# Patient Record
Sex: MALE | Race: WHITE | Employment: OTHER | URBAN - METROPOLITAN AREA
[De-identification: names, ages, dates, MRNs, and addresses within clinical notes are randomized per-mention and may not be internally consistent; named-entity substitution may affect disease eponyms.]

---

## 2018-07-16 LAB
CHOLESTEROL, TOTAL: 167 MG/DL
CHOLESTEROL/HDL RATIO: NORMAL
HDLC SERPL-MCNC: 43 MG/DL (ref 35–70)
LDL CHOLESTEROL CALCULATED: 110 MG/DL (ref 0–160)
TRIGL SERPL-MCNC: 137 MG/DL
VLDLC SERPL CALC-MCNC: NORMAL MG/DL

## 2018-10-16 ENCOUNTER — NURSE ONLY (OUTPATIENT)
Dept: FAMILY MEDICINE CLINIC | Age: 68
End: 2018-10-16
Payer: COMMERCIAL

## 2018-10-16 DIAGNOSIS — Z23 NEED FOR INFLUENZA VACCINATION: Primary | ICD-10-CM

## 2018-10-16 PROCEDURE — G0008 ADMIN INFLUENZA VIRUS VAC: HCPCS | Performed by: FAMILY MEDICINE

## 2018-10-16 PROCEDURE — 90662 IIV NO PRSV INCREASED AG IM: CPT | Performed by: FAMILY MEDICINE

## 2019-01-16 LAB
CHOLESTEROL, TOTAL: 220 MG/DL
CHOLESTEROL/HDL RATIO: NORMAL
HDLC SERPL-MCNC: 45 MG/DL (ref 35–70)
LDL CHOLESTEROL CALCULATED: 160 MG/DL (ref 0–160)
TRIGL SERPL-MCNC: 169 MG/DL
VLDLC SERPL CALC-MCNC: NORMAL MG/DL

## 2019-01-28 ENCOUNTER — OFFICE VISIT (OUTPATIENT)
Dept: FAMILY MEDICINE CLINIC | Age: 69
End: 2019-01-28
Payer: MEDICARE

## 2019-01-28 VITALS
RESPIRATION RATE: 18 BRPM | DIASTOLIC BLOOD PRESSURE: 82 MMHG | HEIGHT: 68 IN | TEMPERATURE: 98.2 F | WEIGHT: 200 LBS | OXYGEN SATURATION: 97 % | SYSTOLIC BLOOD PRESSURE: 128 MMHG | HEART RATE: 80 BPM | BODY MASS INDEX: 30.31 KG/M2

## 2019-01-28 DIAGNOSIS — Z00.00 ROUTINE GENERAL MEDICAL EXAMINATION AT A HEALTH CARE FACILITY: Primary | ICD-10-CM

## 2019-01-28 PROCEDURE — G0439 PPPS, SUBSEQ VISIT: HCPCS | Performed by: FAMILY MEDICINE

## 2019-01-28 RX ORDER — ALLOPURINOL 100 MG/1
200 TABLET ORAL DAILY
COMMUNITY
End: 2020-02-05

## 2019-01-28 RX ORDER — ROSUVASTATIN CALCIUM 10 MG/1
5 TABLET, COATED ORAL EVERY OTHER DAY
COMMUNITY
End: 2020-02-05

## 2019-01-28 ASSESSMENT — ANXIETY QUESTIONNAIRES: GAD7 TOTAL SCORE: 0

## 2019-01-28 ASSESSMENT — PATIENT HEALTH QUESTIONNAIRE - PHQ9
SUM OF ALL RESPONSES TO PHQ QUESTIONS 1-9: 0
SUM OF ALL RESPONSES TO PHQ QUESTIONS 1-9: 0

## 2019-01-28 ASSESSMENT — LIFESTYLE VARIABLES
AUDIT TOTAL SCORE: 4
HOW OFTEN DURING THE LAST YEAR HAVE YOU BEEN UNABLE TO REMEMBER WHAT HAPPENED THE NIGHT BEFORE BECAUSE YOU HAD BEEN DRINKING: 0
HOW OFTEN DURING THE LAST YEAR HAVE YOU FAILED TO DO WHAT WAS NORMALLY EXPECTED FROM YOU BECAUSE OF DRINKING: 0
HOW OFTEN DURING THE LAST YEAR HAVE YOU NEEDED AN ALCOHOLIC DRINK FIRST THING IN THE MORNING TO GET YOURSELF GOING AFTER A NIGHT OF HEAVY DRINKING: 0
HOW OFTEN DO YOU HAVE A DRINK CONTAINING ALCOHOL: 4
HOW OFTEN DURING THE LAST YEAR HAVE YOU HAD A FEELING OF GUILT OR REMORSE AFTER DRINKING: 0
AUDIT-C TOTAL SCORE: 4
HOW MANY STANDARD DRINKS CONTAINING ALCOHOL DO YOU HAVE ON A TYPICAL DAY: 0
HOW OFTEN DURING THE LAST YEAR HAVE YOU FOUND THAT YOU WERE NOT ABLE TO STOP DRINKING ONCE YOU HAD STARTED: 0
HAS A RELATIVE, FRIEND, DOCTOR, OR ANOTHER HEALTH PROFESSIONAL EXPRESSED CONCERN ABOUT YOUR DRINKING OR SUGGESTED YOU CUT DOWN: 0
HAVE YOU OR SOMEONE ELSE BEEN INJURED AS A RESULT OF YOUR DRINKING: 0
HOW OFTEN DO YOU HAVE SIX OR MORE DRINKS ON ONE OCCASION: 0

## 2019-07-23 LAB
ALBUMIN SERPL-MCNC: 4.1 G/DL
ALP BLD-CCNC: 44 U/L
ALT SERPL-CCNC: 24 U/L
ANION GAP SERPL CALCULATED.3IONS-SCNC: 10 MMOL/L
AST SERPL-CCNC: 15 U/L
BASOPHILS ABSOLUTE: 0 /ΜL
BASOPHILS RELATIVE PERCENT: 0.4 %
BILIRUB SERPL-MCNC: 0.8 MG/DL (ref 0.1–1.4)
BILIRUBIN, URINE: NEGATIVE
BLOOD, URINE: NEGATIVE
BUN BLDV-MCNC: 23 MG/DL
CALCIUM SERPL-MCNC: 9.9 MG/DL
CHLORIDE BLD-SCNC: 109 MMOL/L
CHOLESTEROL, TOTAL: 208 MG/DL
CHOLESTEROL/HDL RATIO: NORMAL
CLARITY: CLEAR
CO2: 28 MMOL/L
COLOR: YELLOW
CREAT SERPL-MCNC: 1.2 MG/DL
EOSINOPHILS ABSOLUTE: 0.1 /ΜL
EOSINOPHILS RELATIVE PERCENT: 2 %
GFR CALCULATED: 60
GLUCOSE BLD-MCNC: 105 MG/DL
GLUCOSE URINE: NEGATIVE
HCT VFR BLD CALC: 45.4 % (ref 41–53)
HDLC SERPL-MCNC: 46 MG/DL (ref 35–70)
HEMOGLOBIN: 15.3 G/DL (ref 13.5–17.5)
KETONES, URINE: NEGATIVE
LDL CHOLESTEROL CALCULATED: 135 MG/DL (ref 0–160)
LEUKOCYTE ESTERASE, URINE: NORMAL
LYMPHOCYTES ABSOLUTE: 1.4 /ΜL
LYMPHOCYTES RELATIVE PERCENT: 28.7 %
MCH RBC QN AUTO: 30.1 PG
MCHC RBC AUTO-ENTMCNC: 33.6 G/DL
MCV RBC AUTO: 89.5 FL
MONOCYTES ABSOLUTE: 0.4 /ΜL
MONOCYTES RELATIVE PERCENT: 8.6 %
NEUTROPHILS ABSOLUTE: 2.9 /ΜL
NEUTROPHILS RELATIVE PERCENT: 60.3 %
NITRITE, URINE: NEGATIVE
PH UA: 8 (ref 4.5–8)
PLATELET # BLD: 124 K/ΜL
PMV BLD AUTO: 10.3 FL
POTASSIUM SERPL-SCNC: 4.4 MMOL/L
PROSTATE SPECIFIC ANTIGEN: 0.5 NG/ML
PROTEIN UA: NEGATIVE
RBC # BLD: 5.08 10^6/ΜL
SODIUM BLD-SCNC: 143 MMOL/L
SPECIFIC GRAVITY, URINE: 1
TOTAL PROTEIN: 6.9
TRIGL SERPL-MCNC: 102 MG/DL
TSH SERPL DL<=0.05 MIU/L-ACNC: 2.06 UIU/ML
UROBILINOGEN, URINE: NORMAL
VITAMIN D 25-HYDROXY: 64
VITAMIN D2, 25 HYDROXY: NORMAL
VITAMIN D3,25 HYDROXY: NORMAL
VLDLC SERPL CALC-MCNC: NORMAL MG/DL
WBC # BLD: 4.7 10^3/ML

## 2019-10-23 ENCOUNTER — NURSE ONLY (OUTPATIENT)
Dept: PRIMARY CARE CLINIC | Age: 69
End: 2019-10-23
Payer: MEDICARE

## 2019-10-23 DIAGNOSIS — Z23 NEED FOR INFLUENZA VACCINATION: Primary | ICD-10-CM

## 2019-10-23 PROCEDURE — 90653 IIV ADJUVANT VACCINE IM: CPT | Performed by: FAMILY MEDICINE

## 2019-10-23 PROCEDURE — G0008 ADMIN INFLUENZA VIRUS VAC: HCPCS | Performed by: FAMILY MEDICINE

## 2020-02-05 ENCOUNTER — OFFICE VISIT (OUTPATIENT)
Dept: PRIMARY CARE CLINIC | Age: 70
End: 2020-02-05
Payer: MEDICARE

## 2020-02-05 VITALS
HEIGHT: 68 IN | WEIGHT: 196 LBS | DIASTOLIC BLOOD PRESSURE: 78 MMHG | RESPIRATION RATE: 16 BRPM | OXYGEN SATURATION: 97 % | BODY MASS INDEX: 29.7 KG/M2 | SYSTOLIC BLOOD PRESSURE: 122 MMHG | HEART RATE: 87 BPM

## 2020-02-05 PROCEDURE — G0439 PPPS, SUBSEQ VISIT: HCPCS | Performed by: FAMILY MEDICINE

## 2020-02-05 ASSESSMENT — LIFESTYLE VARIABLES
HAS A RELATIVE, FRIEND, DOCTOR, OR ANOTHER HEALTH PROFESSIONAL EXPRESSED CONCERN ABOUT YOUR DRINKING OR SUGGESTED YOU CUT DOWN: 0
AUDIT-C TOTAL SCORE: 4
HOW MANY STANDARD DRINKS CONTAINING ALCOHOL DO YOU HAVE ON A TYPICAL DAY: 0
HOW OFTEN DURING THE LAST YEAR HAVE YOU NEEDED AN ALCOHOLIC DRINK FIRST THING IN THE MORNING TO GET YOURSELF GOING AFTER A NIGHT OF HEAVY DRINKING: 0
HOW OFTEN DURING THE LAST YEAR HAVE YOU FOUND THAT YOU WERE NOT ABLE TO STOP DRINKING ONCE YOU HAD STARTED: 0
HOW OFTEN DURING THE LAST YEAR HAVE YOU BEEN UNABLE TO REMEMBER WHAT HAPPENED THE NIGHT BEFORE BECAUSE YOU HAD BEEN DRINKING: 0
HOW OFTEN DO YOU HAVE A DRINK CONTAINING ALCOHOL: 4
HOW OFTEN DURING THE LAST YEAR HAVE YOU HAD A FEELING OF GUILT OR REMORSE AFTER DRINKING: 0
HOW OFTEN DURING THE LAST YEAR HAVE YOU FAILED TO DO WHAT WAS NORMALLY EXPECTED FROM YOU BECAUSE OF DRINKING: 0
HAVE YOU OR SOMEONE ELSE BEEN INJURED AS A RESULT OF YOUR DRINKING: 0
AUDIT TOTAL SCORE: 4
HOW OFTEN DO YOU HAVE SIX OR MORE DRINKS ON ONE OCCASION: 0

## 2020-02-05 ASSESSMENT — PATIENT HEALTH QUESTIONNAIRE - PHQ9
SUM OF ALL RESPONSES TO PHQ QUESTIONS 1-9: 0
SUM OF ALL RESPONSES TO PHQ QUESTIONS 1-9: 0

## 2020-02-05 NOTE — PATIENT INSTRUCTIONS
Personalized Preventive Plan for Yancey Klinefelter - 2/5/2020  Medicare offers a range of preventive health benefits. Some of the tests and screenings are paid in full while other may be subject to a deductible, co-insurance, and/or copay. Some of these benefits include a comprehensive review of your medical history including lifestyle, illnesses that may run in your family, and various assessments and screenings as appropriate. After reviewing your medical record and screening and assessments performed today your provider may have ordered immunizations, labs, imaging, and/or referrals for you. A list of these orders (if applicable) as well as your Preventive Care list are included within your After Visit Summary for your review. Other Preventive Recommendations:    · A preventive eye exam performed by an eye specialist is recommended every 1-2 years to screen for glaucoma; cataracts, macular degeneration, and other eye disorders. · A preventive dental visit is recommended every 6 months. · Try to get at least 150 minutes of exercise per week or 10,000 steps per day on a pedometer . · Order or download the FREE \"Exercise & Physical Activity: Your Everyday Guide\" from [employer]. Call 1-154.893.6710 or search [employer] online. · You need 1774-9624 mg of calcium and 6452-9696 IU of vitamin D per day. It is possible to meet your calcium requirement with diet alone, but a vitamin D supplement is usually necessary to meet this goal.  · When exposed to the sun, use a sunscreen that protects against both UVA and UVB radiation with an SPF of 30 or greater. Reapply every 2 to 3 hours or after sweating, drying off with a towel, or swimming. · Always wear a seat belt when traveling in a car. Always wear a helmet when riding a bicycle or motorcycle.

## 2020-02-05 NOTE — PROGRESS NOTES
Medicare Annual Wellness Visit  Name: Kimberli Hammond Date: 2020   MRN: <[id number]> Sex: Male   Age: 78 y.o. Ethnicity: Non-/Non    : 1950 Race: Emre Cheney is here for Medicare AWV    Screenings for behavioral, psychosocial and functional/safety risks, and cognitive dysfunction are all negative except as indicated below. These results, as well as other patient data from the [address] form, are documented in Flowsheets linked to this Encounter. Allergies   Allergen Reactions    Beta Adrenergic Blockers Other (See Comments)     Disorientation, blood pressure increased      Statins Other (See Comments)     Cramping         Prior to Visit Medications    Medication Sig Taking? Authorizing Provider   Multiple Vitamins-Minerals (MULTIVITAMIN ADULT PO) Take by mouth Yes Historical Provider, MD   levothyroxine (SYNTHROID) 50 MCG tablet Take 50 mcg by mouth Daily Yes Historical Provider, MD   aspirin 81 MG tablet Take 81 mg by mouth daily Yes Historical Provider, MD   Cholecalciferol (VITAMIN D3) 1000 units TABS Take by mouth Yes Historical Provider, MD   fosinopril (MONOPRIL) 40 MG tablet Take 40 mg by mouth daily  Yes Historical Provider, MD   [address] by Does not apply route. Yes Historical Provider, MD   nitroGLYCERIN (NITROSTAT) 0.4 MG SL tablet Place 0.4 mg under the tongue every 5 minutes as needed for Chest pain Dissolve 1 tab under tongue at first sign of chest pain. May repeat every 5 minutes until relief is obtained. If pain persists after taking 3 tabs in a 15-minute period, or the pain is different than is typically experienced, call 9-1-1 immediately.   Historical Provider, MD       Past Medical History:   Diagnosis Date    CAD (coronary artery disease)     ATHERECTOMY    History of cardiovascular stress test 2012    STRESS ECHO    Hyperlipidemia     Thyroid disease        Past Surgical History:   Procedure Laterality Date    FRACTURE SURGERY      RIGHT ANKLE REPAIR       History reviewed. No pertinent family history. CareTeam (Including outside providers/suppliers regularly involved in providing care):   Patient Care Team:  Truman Prader, DO as PCP - General (Family Medicine)  Truman Prader, DO as PCP - [facility] Empaneled Provider    Wt Readings from Last 3 Encounters:   02/05/20 196 lb (88.9 kg)   01/28/19 200 lb (90.7 kg)   01/10/18 198 lb (89.8 kg)     Vitals:    02/05/20 0756   BP: 122/78   Pulse: 87   Resp: 16   SpO2: 97%   Weight: 196 lb (88.9 kg)   Height: 5' 8\" (1.727 m)     Body mass index is 29.8 kg/m². Based upon direct observation of the patient, evaluation of cognition reveals recent and remote memory intact.     General Appearance: alert and oriented to person, place and time, well developed and well- nourished, in no acute distress  Skin: warm and dry, no rash or erythema  Head: normocephalic and atraumatic  Eyes: pupils equal, round, and reactive to light, extraocular eye movements intact, conjunctivae normal  ENT: tympanic membrane, external ear and ear canal normal bilaterally, nose without deformity, nasal mucosa and turbinates normal without polyps  Neck: supple and non-tender without mass, no thyromegaly or thyroid nodules, no cervical lymphadenopathy  Pulmonary/Chest: clear to auscultation bilaterally- no wheezes, rales or rhonchi, normal air movement, no respiratory distress  Cardiovascular: normal rate, regular rhythm, normal S1 and S2, no murmurs, rubs, clicks, or gallops, distal pulses intact, no carotid bruits  Abdomen: soft, non-tender, non-distended, normal bowel sounds, no masses or organomegaly  Extremities: no cyanosis, clubbing or edema  Musculoskeletal: normal range of motion, no joint swelling, deformity or tenderness  Neurologic: reflexes normal and symmetric, no cranial nerve deficit, gait, coordination and speech normal    Patient's complete Health Risk Assessment and screening values have been

## 2020-10-21 ENCOUNTER — APPOINTMENT (OUTPATIENT)
Dept: CT IMAGING | Age: 70
End: 2020-10-21
Payer: MEDICARE

## 2020-10-21 ENCOUNTER — HOSPITAL ENCOUNTER (EMERGENCY)
Age: 70
Discharge: HOME | End: 2020-10-21
Attending: EMERGENCY MEDICINE
Payer: MEDICARE

## 2020-10-21 VITALS
TEMPERATURE: 98.9 F | DIASTOLIC BLOOD PRESSURE: 110 MMHG | SYSTOLIC BLOOD PRESSURE: 174 MMHG | BODY MASS INDEX: 28.49 KG/M2 | WEIGHT: 188 LBS | OXYGEN SATURATION: 91 % | RESPIRATION RATE: 20 BRPM | HEART RATE: 118 BPM | HEIGHT: 68 IN

## 2020-10-21 LAB
ANION GAP SERPL CALCULATED.3IONS-SCNC: 9 MMOL/L (ref 7–16)
BASOPHILS ABSOLUTE: 0.02 E9/L (ref 0–0.2)
BASOPHILS RELATIVE PERCENT: 0.2 % (ref 0–2)
BUN BLDV-MCNC: 14 MG/DL (ref 8–23)
CALCIUM SERPL-MCNC: 9.8 MG/DL (ref 8.6–10.2)
CHLORIDE BLD-SCNC: 104 MMOL/L (ref 98–107)
CO2: 22 MMOL/L (ref 22–29)
CREAT SERPL-MCNC: 1 MG/DL (ref 0.7–1.2)
EOSINOPHILS ABSOLUTE: 0.05 E9/L (ref 0.05–0.5)
EOSINOPHILS RELATIVE PERCENT: 0.5 % (ref 0–6)
GFR AFRICAN AMERICAN: >60
GFR NON-AFRICAN AMERICAN: >60 ML/MIN/1.73
GLUCOSE BLD-MCNC: 111 MG/DL (ref 74–99)
HCT VFR BLD CALC: 46.8 % (ref 37–54)
HEMOGLOBIN: 15.5 G/DL (ref 12.5–16.5)
IMMATURE GRANULOCYTES #: 0.03 E9/L
IMMATURE GRANULOCYTES %: 0.3 % (ref 0–5)
LACTIC ACID: 1.7 MMOL/L (ref 0.5–2.2)
LYMPHOCYTES ABSOLUTE: 0.73 E9/L (ref 1.5–4)
LYMPHOCYTES RELATIVE PERCENT: 7.2 % (ref 20–42)
MCH RBC QN AUTO: 29.9 PG (ref 26–35)
MCHC RBC AUTO-ENTMCNC: 33.1 % (ref 32–34.5)
MCV RBC AUTO: 90.2 FL (ref 80–99.9)
MONOCYTES ABSOLUTE: 0.55 E9/L (ref 0.1–0.95)
MONOCYTES RELATIVE PERCENT: 5.4 % (ref 2–12)
NEUTROPHILS ABSOLUTE: 8.81 E9/L (ref 1.8–7.3)
NEUTROPHILS RELATIVE PERCENT: 86.4 % (ref 43–80)
PDW BLD-RTO: 13.8 FL (ref 11.5–15)
PLATELET # BLD: 144 E9/L (ref 130–450)
PMV BLD AUTO: 11.9 FL (ref 7–12)
POTASSIUM REFLEX MAGNESIUM: 4.1 MMOL/L (ref 3.5–5)
PRO-BNP: 2038 PG/ML (ref 0–125)
RBC # BLD: 5.19 E12/L (ref 3.8–5.8)
SARS-COV-2, NAAT: NOT DETECTED
SODIUM BLD-SCNC: 135 MMOL/L (ref 132–146)
TROPONIN: <0.01 NG/ML (ref 0–0.03)
WBC # BLD: 10.2 E9/L (ref 4.5–11.5)

## 2020-10-21 PROCEDURE — 93005 ELECTROCARDIOGRAM TRACING: CPT | Performed by: STUDENT IN AN ORGANIZED HEALTH CARE EDUCATION/TRAINING PROGRAM

## 2020-10-21 PROCEDURE — 2580000003 HC RX 258: Performed by: RADIOLOGY

## 2020-10-21 PROCEDURE — 6360000004 HC RX CONTRAST MEDICATION: Performed by: RADIOLOGY

## 2020-10-21 PROCEDURE — 85025 COMPLETE CBC W/AUTO DIFF WBC: CPT

## 2020-10-21 PROCEDURE — U0002 COVID-19 LAB TEST NON-CDC: HCPCS

## 2020-10-21 PROCEDURE — 2580000003 HC RX 258: Performed by: STUDENT IN AN ORGANIZED HEALTH CARE EDUCATION/TRAINING PROGRAM

## 2020-10-21 PROCEDURE — 84484 ASSAY OF TROPONIN QUANT: CPT

## 2020-10-21 PROCEDURE — 83880 ASSAY OF NATRIURETIC PEPTIDE: CPT

## 2020-10-21 PROCEDURE — 71275 CT ANGIOGRAPHY CHEST: CPT

## 2020-10-21 PROCEDURE — 80048 BASIC METABOLIC PNL TOTAL CA: CPT

## 2020-10-21 PROCEDURE — 6370000000 HC RX 637 (ALT 250 FOR IP): Performed by: STUDENT IN AN ORGANIZED HEALTH CARE EDUCATION/TRAINING PROGRAM

## 2020-10-21 PROCEDURE — 83605 ASSAY OF LACTIC ACID: CPT

## 2020-10-21 PROCEDURE — 99285 EMERGENCY DEPT VISIT HI MDM: CPT

## 2020-10-21 PROCEDURE — 96374 THER/PROPH/DIAG INJ IV PUSH: CPT

## 2020-10-21 PROCEDURE — 6360000002 HC RX W HCPCS: Performed by: STUDENT IN AN ORGANIZED HEALTH CARE EDUCATION/TRAINING PROGRAM

## 2020-10-21 RX ORDER — DOXYCYCLINE 100 MG/1
100 CAPSULE ORAL 2 TIMES DAILY
Qty: 20 CAPSULE | Refills: 0 | Status: SHIPPED | OUTPATIENT
Start: 2020-10-21 | End: 2020-10-21 | Stop reason: SDUPTHER

## 2020-10-21 RX ORDER — CEFDINIR 300 MG/1
300 CAPSULE ORAL 2 TIMES DAILY
Qty: 20 CAPSULE | Refills: 0 | Status: SHIPPED | OUTPATIENT
Start: 2020-10-21 | End: 2020-10-31

## 2020-10-21 RX ORDER — METHYLPREDNISOLONE SODIUM SUCCINATE 125 MG/2ML
125 INJECTION, POWDER, LYOPHILIZED, FOR SOLUTION INTRAMUSCULAR; INTRAVENOUS ONCE
Status: COMPLETED | OUTPATIENT
Start: 2020-10-21 | End: 2020-10-21

## 2020-10-21 RX ORDER — DOXYCYCLINE 100 MG/1
100 CAPSULE ORAL 2 TIMES DAILY
Qty: 20 CAPSULE | Refills: 0 | Status: SHIPPED | OUTPATIENT
Start: 2020-10-21 | End: 2020-10-31

## 2020-10-21 RX ORDER — PREDNISONE 10 MG/1
TABLET ORAL
Qty: 20 TABLET | Refills: 0 | Status: SHIPPED | OUTPATIENT
Start: 2020-10-21 | End: 2020-10-21 | Stop reason: SDUPTHER

## 2020-10-21 RX ORDER — CEFDINIR 300 MG/1
300 CAPSULE ORAL ONCE
Status: COMPLETED | OUTPATIENT
Start: 2020-10-21 | End: 2020-10-21

## 2020-10-21 RX ORDER — SODIUM CHLORIDE 0.9 % (FLUSH) 0.9 %
SYRINGE (ML) INJECTION
Status: DISCONTINUED
Start: 2020-10-21 | End: 2020-10-21 | Stop reason: HOSPADM

## 2020-10-21 RX ORDER — SODIUM CHLORIDE 0.9 % (FLUSH) 0.9 %
10 SYRINGE (ML) INJECTION PRN
Status: COMPLETED | OUTPATIENT
Start: 2020-10-21 | End: 2020-10-21

## 2020-10-21 RX ORDER — PREDNISONE 10 MG/1
TABLET ORAL
Qty: 20 TABLET | Refills: 0 | Status: SHIPPED | OUTPATIENT
Start: 2020-10-21 | End: 2020-10-31

## 2020-10-21 RX ORDER — DOXYCYCLINE HYCLATE 100 MG/1
100 CAPSULE ORAL ONCE
Status: COMPLETED | OUTPATIENT
Start: 2020-10-21 | End: 2020-10-21

## 2020-10-21 RX ORDER — CEFDINIR 300 MG/1
300 CAPSULE ORAL 2 TIMES DAILY
Qty: 20 CAPSULE | Refills: 0 | Status: SHIPPED | OUTPATIENT
Start: 2020-10-21 | End: 2020-10-21 | Stop reason: SDUPTHER

## 2020-10-21 RX ORDER — 0.9 % SODIUM CHLORIDE 0.9 %
1000 INTRAVENOUS SOLUTION INTRAVENOUS ONCE
Status: COMPLETED | OUTPATIENT
Start: 2020-10-21 | End: 2020-10-21

## 2020-10-21 RX ADMIN — DOXYCYCLINE HYCLATE 100 MG: 100 CAPSULE ORAL at 19:07

## 2020-10-21 RX ADMIN — IOPAMIDOL 75 ML: 755 INJECTION, SOLUTION INTRAVENOUS at 17:47

## 2020-10-21 RX ADMIN — SODIUM CHLORIDE 1000 ML: 9 INJECTION, SOLUTION INTRAVENOUS at 15:33

## 2020-10-21 RX ADMIN — Medication 10 ML: at 17:45

## 2020-10-21 RX ADMIN — METHYLPREDNISOLONE SODIUM SUCCINATE 125 MG: 125 INJECTION, POWDER, FOR SOLUTION INTRAMUSCULAR; INTRAVENOUS at 19:08

## 2020-10-21 RX ADMIN — CEFDINIR 300 MG: 300 CAPSULE ORAL at 19:07

## 2020-10-21 ASSESSMENT — ENCOUNTER SYMPTOMS
COUGH: 1
SORE THROAT: 0
NAUSEA: 0
WHEEZING: 1
BACK PAIN: 0
VOMITING: 0
DIARRHEA: 0
EYE DISCHARGE: 0
EYE PAIN: 0
ABDOMINAL PAIN: 0
SINUS PRESSURE: 0
EYE REDNESS: 0
SHORTNESS OF BREATH: 1

## 2020-10-21 NOTE — ED PROVIDER NOTES
not diaphoretic. HENT:      Head: Normocephalic and atraumatic. Eyes:      Conjunctiva/sclera: Conjunctivae normal.   Neck:      Musculoskeletal: Normal range of motion and neck supple. Cardiovascular:      Rate and Rhythm: Regular rhythm. Tachycardia present. Pulses: Normal pulses. Heart sounds: Normal heart sounds. No murmur. Pulmonary:      Effort: Pulmonary effort is normal. Tachypnea present. No accessory muscle usage or respiratory distress. Breath sounds: Examination of the right-lower field reveals rales. Examination of the left-lower field reveals wheezing. Wheezing and rales present. Chest:      Chest wall: No tenderness. Abdominal:      General: Bowel sounds are normal.      Palpations: Abdomen is soft. Tenderness: There is no abdominal tenderness. There is no guarding or rebound. Musculoskeletal:         General: No tenderness or deformity. Right lower leg: No edema. Left lower leg: No edema. Skin:     General: Skin is warm and dry. Coloration: Skin is not cyanotic or pale. Neurological:      Mental Status: He is alert and oriented to person, place, and time. Cranial Nerves: No cranial nerve deficit. Coordination: Coordination normal.          Procedures     MDM  Number of Diagnoses or Management Options  Diagnosis management comments: Patient hypoxic in the low 90s also tachycardic in the 120s. Requested by physician to evaluate for Covid. Covid was negative. CTA ordered to evaluate for PEs which was negative for PEs however there did show the pneumonia present. Patient already attempted to take azithromycin to no improvement. Patient given doxycycline and Omnicef here in the ED and will have a prescription for home. Was also given a dose of Solu-Medrol for his breathing. Patient ambulated and his sats were maintained at 93% and his heart rate was 128.   Patient was recommended to be admitted to the hospital however he states that he (L) 1.50 - 4.00 E9/L    Monocytes Absolute 0.55 0.10 - 0.95 E9/L    Eosinophils Absolute 0.05 0.05 - 0.50 E9/L    Basophils Absolute 0.02 0.00 - 0.20 Y1/V   Basic Metabolic Panel w/ Reflex to MG   Result Value Ref Range    Sodium 135 132 - 146 mmol/L    Potassium reflex Magnesium 4.1 3.5 - 5.0 mmol/L    Chloride 104 98 - 107 mmol/L    CO2 22 22 - 29 mmol/L    Anion Gap 9 7 - 16 mmol/L    Glucose 111 (H) 74 - 99 mg/dL    BUN 14 8 - 23 mg/dL    CREATININE 1.0 0.7 - 1.2 mg/dL    GFR Non-African American >60 >=60 mL/min/1.73    GFR African American >60     Calcium 9.8 8.6 - 10.2 mg/dL   Troponin   Result Value Ref Range    Troponin <0.01 0.00 - 0.03 ng/mL   Lactic Acid, Plasma   Result Value Ref Range    Lactic Acid 1.7 0.5 - 2.2 mmol/L   COVID-19   Result Value Ref Range    SARS-CoV-2, NAAT Not Detected Not Detected   Brain Natriuretic Peptide   Result Value Ref Range    Pro-BNP 2,038 (H) 0 - 125 pg/mL       Radiology:  CTA PULMONARY W CONTRAST   Final Result   Small bilateral pleural effusion with adjacent compressive atelectasis and/or   small foci of pneumonia. Nonspecific bilateral lower lobe pulmonary nodules less than 6 mm. These may   be inflammatory or infectious. Recommend follow-up chest CT in 12 months as   surveillance for neoplasm. No CT evidence of PE      Slight cardiomegaly             ------------------------- NURSING NOTES AND VITALS REVIEWED ---------------------------  Date / Time Roomed:  10/21/2020  2:34 PM  ED Bed Assignment:  03/03    The nursing notes within the ED encounter and vital signs as below have been reviewed.    BP (!) 174/110   Pulse 118 Comment: ambulating  Temp 98.9 °F (37.2 °C)   Resp 20   Ht 5' 8\" (1.727 m)   Wt 188 lb (85.3 kg)   SpO2 91%   BMI 28.59 kg/m²   Oxygen Saturation Interpretation: Normal      ------------------------------------------ PROGRESS NOTES ------------------------------------------  7:27 PM EDT  I have spoken with the patient and discussed todays results, in addition to providing specific details for the plan of care and counseling regarding the diagnosis and prognosis. Their questions are answered at this time and they are agreeable with the plan. I discussed at length with them reasons for immediate return here for re evaluation. They will followup with their primary care physician by calling their office tomorrow. --------------------------------- ADDITIONAL PROVIDER NOTES ---------------------------------  At this time the patient is without objective evidence of an acute process requiring hospitalization or inpatient management. They have remained hemodynamically stable throughout their entire ED visit and are stable for discharge with outpatient follow-up. The plan has been discussed in detail and they are aware of the specific conditions for emergent return, as well as the importance of follow-up. Current Discharge Medication List      START taking these medications    Details   cefdinir (OMNICEF) 300 MG capsule Take 1 capsule by mouth 2 times daily for 10 days  Qty: 20 capsule, Refills: 0      predniSONE (DELTASONE) 10 MG tablet Take 40mg po qd x 5 days QS for 5 days  Qty: 20 tablet, Refills: 0      doxycycline monohydrate (MONODOX) 100 MG capsule Take 1 capsule by mouth 2 times daily for 10 days  Qty: 20 capsule, Refills: 0             Diagnosis:  1. Pneumonia due to organism    2. Tachycardia    3. Shortness of breath    4. Elevated brain natriuretic peptide (BNP) level        Disposition:  Patient's disposition: Discharge to home  Patient's condition is stable.     Patient was seen and evaluated by both myself and Sultana Hanley 32, DO  Resident  10/21/20 4195

## 2020-10-21 NOTE — ED NOTES
Reviewed discharge instructions with patient, patient verbalized understanding of follow up care and denies any further questions, no acute signs/symptoms of distress upon discharge       Bob Linares RN  10/21/20 1941

## 2020-10-21 NOTE — ED NOTES
Pt ambulated independently. Ambulating pulse 128 and oxygen 93%. Pt reported being short of breath upon return to room.      Chelsi Willis  10/21/20 8822

## 2020-10-22 LAB
EKG ATRIAL RATE: 119 BPM
EKG P AXIS: 55 DEGREES
EKG P-R INTERVAL: 120 MS
EKG Q-T INTERVAL: 348 MS
EKG QRS DURATION: 124 MS
EKG QTC CALCULATION (BAZETT): 489 MS
EKG R AXIS: -34 DEGREES
EKG T AXIS: 127 DEGREES
EKG VENTRICULAR RATE: 119 BPM

## 2020-10-22 PROCEDURE — 93010 ELECTROCARDIOGRAM REPORT: CPT | Performed by: INTERNAL MEDICINE

## 2020-12-07 ENCOUNTER — APPOINTMENT (OUTPATIENT)
Dept: GENERAL RADIOLOGY | Age: 70
End: 2020-12-07
Payer: MEDICARE

## 2020-12-07 ENCOUNTER — HOSPITAL ENCOUNTER (INPATIENT)
Age: 70
LOS: 5 days | Discharge: HOME | DRG: 292 | End: 2020-12-12
Attending: INTERNAL MEDICINE | Admitting: INTERNAL MEDICINE
Payer: MEDICARE

## 2020-12-07 ENCOUNTER — HOSPITAL ENCOUNTER (EMERGENCY)
Age: 70
Discharge: ANOTHER HOSPITAL | End: 2020-12-07
Attending: FAMILY MEDICINE
Payer: MEDICARE

## 2020-12-07 VITALS
OXYGEN SATURATION: 98 % | RESPIRATION RATE: 16 BRPM | DIASTOLIC BLOOD PRESSURE: 72 MMHG | BODY MASS INDEX: 28.04 KG/M2 | HEART RATE: 96 BPM | HEIGHT: 68 IN | TEMPERATURE: 97.8 F | WEIGHT: 185 LBS | SYSTOLIC BLOOD PRESSURE: 108 MMHG

## 2020-12-07 PROBLEM — I50.43 CHF (CONGESTIVE HEART FAILURE), NYHA CLASS I, ACUTE ON CHRONIC, COMBINED (HCC): Status: ACTIVE | Noted: 2020-12-07

## 2020-12-07 PROBLEM — R93.1 ABNORMAL ECHOCARDIOGRAM: Status: ACTIVE | Noted: 2020-12-07

## 2020-12-07 LAB
ADENOVIRUS BY PCR: NOT DETECTED
ANION GAP SERPL CALCULATED.3IONS-SCNC: 14 MMOL/L (ref 7–16)
BACTERIA: ABNORMAL /HPF
BASOPHILS ABSOLUTE: 0.03 E9/L (ref 0–0.2)
BASOPHILS RELATIVE PERCENT: 0.4 % (ref 0–2)
BILIRUBIN URINE: NEGATIVE
BLOOD, URINE: ABNORMAL
BORDETELLA PARAPERTUSSIS BY PCR: NOT DETECTED
BORDETELLA PERTUSSIS BY PCR: NOT DETECTED
BUN BLDV-MCNC: 22 MG/DL (ref 8–23)
CALCIUM SERPL-MCNC: 10.1 MG/DL (ref 8.6–10.2)
CHLAMYDOPHILIA PNEUMONIAE BY PCR: NOT DETECTED
CHLORIDE BLD-SCNC: 97 MMOL/L (ref 98–107)
CLARITY: CLEAR
CO2: 21 MMOL/L (ref 22–29)
COLOR: YELLOW
CORONAVIRUS 229E BY PCR: NOT DETECTED
CORONAVIRUS HKU1 BY PCR: NOT DETECTED
CORONAVIRUS NL63 BY PCR: NOT DETECTED
CORONAVIRUS OC43 BY PCR: NOT DETECTED
CREAT SERPL-MCNC: 1.2 MG/DL (ref 0.7–1.2)
D DIMER: 391 NG/ML DDU
EKG ATRIAL RATE: 102 BPM
EKG P AXIS: 52 DEGREES
EKG P-R INTERVAL: 158 MS
EKG Q-T INTERVAL: 366 MS
EKG QRS DURATION: 130 MS
EKG QTC CALCULATION (BAZETT): 477 MS
EKG R AXIS: -27 DEGREES
EKG T AXIS: 120 DEGREES
EKG VENTRICULAR RATE: 102 BPM
EOSINOPHILS ABSOLUTE: 0.04 E9/L (ref 0.05–0.5)
EOSINOPHILS RELATIVE PERCENT: 0.5 % (ref 0–6)
EPITHELIAL CELLS, UA: ABNORMAL /HPF
GFR AFRICAN AMERICAN: >60
GFR NON-AFRICAN AMERICAN: 60 ML/MIN/1.73
GLUCOSE BLD-MCNC: 131 MG/DL (ref 74–99)
GLUCOSE URINE: NEGATIVE MG/DL
HCT VFR BLD CALC: 43.6 % (ref 37–54)
HEMOGLOBIN: 14.5 G/DL (ref 12.5–16.5)
HUMAN METAPNEUMOVIRUS BY PCR: NOT DETECTED
HUMAN RHINOVIRUS/ENTEROVIRUS BY PCR: NOT DETECTED
IMMATURE GRANULOCYTES #: 0.02 E9/L
IMMATURE GRANULOCYTES %: 0.3 % (ref 0–5)
INFLUENZA A BY PCR: NOT DETECTED
INFLUENZA B BY PCR: NOT DETECTED
KETONES, URINE: NEGATIVE MG/DL
LEUKOCYTE ESTERASE, URINE: ABNORMAL
LYMPHOCYTES ABSOLUTE: 1.13 E9/L (ref 1.5–4)
LYMPHOCYTES RELATIVE PERCENT: 14.9 % (ref 20–42)
MCH RBC QN AUTO: 30.2 PG (ref 26–35)
MCHC RBC AUTO-ENTMCNC: 33.3 % (ref 32–34.5)
MCV RBC AUTO: 90.8 FL (ref 80–99.9)
MONOCYTES ABSOLUTE: 0.62 E9/L (ref 0.1–0.95)
MONOCYTES RELATIVE PERCENT: 8.2 % (ref 2–12)
MYCOPLASMA PNEUMONIAE BY PCR: NOT DETECTED
NEUTROPHILS ABSOLUTE: 5.74 E9/L (ref 1.8–7.3)
NEUTROPHILS RELATIVE PERCENT: 75.7 % (ref 43–80)
NITRITE, URINE: NEGATIVE
PARAINFLUENZA VIRUS 1 BY PCR: NOT DETECTED
PARAINFLUENZA VIRUS 2 BY PCR: NOT DETECTED
PARAINFLUENZA VIRUS 3 BY PCR: NOT DETECTED
PARAINFLUENZA VIRUS 4 BY PCR: NOT DETECTED
PDW BLD-RTO: 14.6 FL (ref 11.5–15)
PH UA: 6 (ref 5–9)
PLATELET # BLD: 151 E9/L (ref 130–450)
PMV BLD AUTO: 12.2 FL (ref 7–12)
POTASSIUM REFLEX MAGNESIUM: 4.1 MMOL/L (ref 3.5–5)
PRO-BNP: 4547 PG/ML (ref 0–125)
PROTEIN UA: NEGATIVE MG/DL
RBC # BLD: 4.8 E12/L (ref 3.8–5.8)
RBC UA: ABNORMAL /HPF (ref 0–2)
RESPIRATORY SYNCYTIAL VIRUS BY PCR: NOT DETECTED
SARS-COV-2, PCR: NOT DETECTED
SODIUM BLD-SCNC: 132 MMOL/L (ref 132–146)
SPECIFIC GRAVITY UA: 1.01 (ref 1–1.03)
TROPONIN: <0.01 NG/ML (ref 0–0.03)
TROPONIN: <0.01 NG/ML (ref 0–0.03)
UROBILINOGEN, URINE: 0.2 E.U./DL
WBC # BLD: 7.6 E9/L (ref 4.5–11.5)
WBC UA: ABNORMAL /HPF (ref 0–5)

## 2020-12-07 PROCEDURE — 84484 ASSAY OF TROPONIN QUANT: CPT

## 2020-12-07 PROCEDURE — 96374 THER/PROPH/DIAG INJ IV PUSH: CPT

## 2020-12-07 PROCEDURE — 93005 ELECTROCARDIOGRAM TRACING: CPT | Performed by: FAMILY MEDICINE

## 2020-12-07 PROCEDURE — 80048 BASIC METABOLIC PNL TOTAL CA: CPT

## 2020-12-07 PROCEDURE — 71045 X-RAY EXAM CHEST 1 VIEW: CPT

## 2020-12-07 PROCEDURE — 36415 COLL VENOUS BLD VENIPUNCTURE: CPT

## 2020-12-07 PROCEDURE — 6360000002 HC RX W HCPCS: Performed by: FAMILY MEDICINE

## 2020-12-07 PROCEDURE — 83880 ASSAY OF NATRIURETIC PEPTIDE: CPT

## 2020-12-07 PROCEDURE — 99284 EMERGENCY DEPT VISIT MOD MDM: CPT

## 2020-12-07 PROCEDURE — 85378 FIBRIN DEGRADE SEMIQUANT: CPT

## 2020-12-07 PROCEDURE — 85025 COMPLETE CBC W/AUTO DIFF WBC: CPT

## 2020-12-07 PROCEDURE — 93010 ELECTROCARDIOGRAM REPORT: CPT | Performed by: INTERNAL MEDICINE

## 2020-12-07 PROCEDURE — 84443 ASSAY THYROID STIM HORMONE: CPT

## 2020-12-07 PROCEDURE — 2060000000 HC ICU INTERMEDIATE R&B

## 2020-12-07 PROCEDURE — 0202U NFCT DS 22 TRGT SARS-COV-2: CPT

## 2020-12-07 PROCEDURE — 93005 ELECTROCARDIOGRAM TRACING: CPT | Performed by: EMERGENCY MEDICINE

## 2020-12-07 PROCEDURE — 81001 URINALYSIS AUTO W/SCOPE: CPT

## 2020-12-07 RX ORDER — ACETAMINOPHEN 325 MG/1
650 TABLET ORAL EVERY 6 HOURS PRN
Status: DISCONTINUED | OUTPATIENT
Start: 2020-12-07 | End: 2020-12-07 | Stop reason: HOSPADM

## 2020-12-07 RX ORDER — SODIUM CHLORIDE 0.9 % (FLUSH) 0.9 %
10 SYRINGE (ML) INJECTION EVERY 12 HOURS SCHEDULED
Status: DISCONTINUED | OUTPATIENT
Start: 2020-12-07 | End: 2020-12-07 | Stop reason: HOSPADM

## 2020-12-07 RX ORDER — ACETAMINOPHEN 650 MG/1
650 SUPPOSITORY RECTAL EVERY 6 HOURS PRN
Status: DISCONTINUED | OUTPATIENT
Start: 2020-12-07 | End: 2020-12-07 | Stop reason: HOSPADM

## 2020-12-07 RX ORDER — POLYETHYLENE GLYCOL 3350 17 G/17G
17 POWDER, FOR SOLUTION ORAL DAILY PRN
Status: DISCONTINUED | OUTPATIENT
Start: 2020-12-07 | End: 2020-12-07 | Stop reason: HOSPADM

## 2020-12-07 RX ORDER — FUROSEMIDE 40 MG/1
40 TABLET ORAL 2 TIMES DAILY
Status: ON HOLD | COMMUNITY
End: 2020-12-12 | Stop reason: HOSPADM

## 2020-12-07 RX ORDER — SODIUM CHLORIDE 0.9 % (FLUSH) 0.9 %
10 SYRINGE (ML) INJECTION PRN
Status: DISCONTINUED | OUTPATIENT
Start: 2020-12-07 | End: 2020-12-07 | Stop reason: HOSPADM

## 2020-12-07 RX ORDER — FUROSEMIDE 10 MG/ML
40 INJECTION INTRAMUSCULAR; INTRAVENOUS ONCE
Status: COMPLETED | OUTPATIENT
Start: 2020-12-07 | End: 2020-12-07

## 2020-12-07 RX ORDER — LOSARTAN POTASSIUM 25 MG/1
50 TABLET ORAL DAILY
Status: DISCONTINUED | OUTPATIENT
Start: 2020-12-08 | End: 2020-12-07 | Stop reason: HOSPADM

## 2020-12-07 RX ORDER — ASPIRIN 81 MG/1
81 TABLET, CHEWABLE ORAL DAILY
Status: DISCONTINUED | OUTPATIENT
Start: 2020-12-07 | End: 2020-12-07 | Stop reason: HOSPADM

## 2020-12-07 RX ORDER — PANTOPRAZOLE SODIUM 40 MG/1
40 TABLET, DELAYED RELEASE ORAL
Status: DISCONTINUED | OUTPATIENT
Start: 2020-12-08 | End: 2020-12-07 | Stop reason: HOSPADM

## 2020-12-07 RX ORDER — PROMETHAZINE HYDROCHLORIDE 25 MG/1
12.5 TABLET ORAL EVERY 6 HOURS PRN
Status: DISCONTINUED | OUTPATIENT
Start: 2020-12-07 | End: 2020-12-07 | Stop reason: HOSPADM

## 2020-12-07 RX ORDER — ONDANSETRON 2 MG/ML
4 INJECTION INTRAMUSCULAR; INTRAVENOUS EVERY 6 HOURS PRN
Status: DISCONTINUED | OUTPATIENT
Start: 2020-12-07 | End: 2020-12-07 | Stop reason: HOSPADM

## 2020-12-07 RX ORDER — FUROSEMIDE 10 MG/ML
40 INJECTION INTRAMUSCULAR; INTRAVENOUS 2 TIMES DAILY
Status: DISCONTINUED | OUTPATIENT
Start: 2020-12-07 | End: 2020-12-07 | Stop reason: HOSPADM

## 2020-12-07 RX ORDER — LEVOTHYROXINE SODIUM 0.05 MG/1
50 TABLET ORAL DAILY
Status: DISCONTINUED | OUTPATIENT
Start: 2020-12-07 | End: 2020-12-07 | Stop reason: HOSPADM

## 2020-12-07 RX ADMIN — FUROSEMIDE 40 MG: 10 INJECTION, SOLUTION INTRAVENOUS at 13:28

## 2020-12-07 ASSESSMENT — PAIN SCALES - GENERAL: PAINLEVEL_OUTOF10: 0

## 2020-12-07 NOTE — ED PROVIDER NOTES
HPI:  12/7/20,   Time: 1:05 PM MARIA ELENA Rowland is a 69 y.o. male presenting to the ED for a 1 week history of worsening of shortness of breath. He was diagnosed with congestive heart failure about for 5 weeks ago. He was then started on diuretics. However, in the last few days his shortness of breath is worsened. He does get short of breath walking from one room to another in his home. He denies chest pain or palpitations. He denies diaphoresis. About 3 months ago, he went through a series of problems with cough that developed into pneumonia. In a follow-up x-ray after pneumonia had cleared, it was noted that he had findings suspicious for CHF which prompted further work-up that included a 2D echocardiogram.           ROS:   Pertinent positives and negatives are stated within HPI, all other systems reviewed and are negative.  --------------------------------------------- PAST HISTORY ---------------------------------------------  Past Medical History:  has a past medical history of CAD (coronary artery disease), CHF (congestive heart failure) ([facility] [address] History of cardiovascular stress test, Hyperlipidemia, and Thyroid disease. Past Surgical History:  has a past surgical history that includes fracture surgery. Social History:  reports that he quit smoking about 31 years ago. He has a 20.00 pack-year smoking history. He has quit using smokeless tobacco.    Family History: family history is not on file. The patients home medications have been reviewed.     Allergies: Beta adrenergic blockers and Statins    -------------------------------------------------- RESULTS -------------------------------------------------  All laboratory and radiology results have been personally reviewed by myself   LABS:  Results for orders placed or performed during the hospital encounter of 12/07/20   CBC Auto Differential   Result Value Ref Range    WBC 7.6 4.5 - 11.5 E9/L    RBC 4.80 3.80 - 5.80 E12/L Hemoglobin 14.5 12.5 - 16.5 g/dL    Hematocrit 43.6 37.0 - 54.0 %    MCV 90.8 80.0 - 99.9 fL    MCH 30.2 26.0 - 35.0 pg    MCHC 33.3 32.0 - 34.5 %    RDW 14.6 11.5 - 15.0 fL    Platelets 838 465 - 518 E9/L    MPV 12.2 (H) 7.0 - 12.0 fL    Neutrophils % 75.7 43.0 - 80.0 %    Immature Granulocytes % 0.3 0.0 - 5.0 %    Lymphocytes % 14.9 (L) 20.0 - 42.0 %    Monocytes % 8.2 2.0 - 12.0 %    Eosinophils % 0.5 0.0 - 6.0 %    Basophils % 0.4 0.0 - 2.0 %    Neutrophils Absolute 5.74 1.80 - 7.30 E9/L    Immature Granulocytes # 0.02 E9/L    Lymphocytes Absolute 1.13 (L) 1.50 - 4.00 E9/L    Monocytes Absolute 0.62 0.10 - 0.95 E9/L    Eosinophils Absolute 0.04 (L) 0.05 - 0.50 E9/L    Basophils Absolute 0.03 0.00 - 0.20 K9/Q   Basic Metabolic Panel w/ Reflex to MG   Result Value Ref Range    Sodium 132 132 - 146 mmol/L    Potassium reflex Magnesium 4.1 3.5 - 5.0 mmol/L    Chloride 97 (L) 98 - 107 mmol/L    CO2 21 (L) 22 - 29 mmol/L    Anion Gap 14 7 - 16 mmol/L    Glucose 131 (H) 74 - 99 mg/dL    BUN 22 8 - 23 mg/dL    CREATININE 1.2 0.7 - 1.2 mg/dL    GFR Non-African American 60 >=60 mL/min/1.73    GFR African American >60     Calcium 10.1 8.6 - 10.2 mg/dL   Troponin   Result Value Ref Range    Troponin <0.01 0.00 - 0.03 ng/mL   Brain Natriuretic Peptide   Result Value Ref Range    Pro-BNP 4,547 (H) 0 - 125 pg/mL   D-Dimer, Quantitative   Result Value Ref Range    D-Dimer, Quant 391 ng/mL DDU   Urinalysis, reflex to microscopic   Result Value Ref Range    Color, UA Yellow Straw/Yellow    Clarity, UA Clear Clear    Glucose, Ur Negative Negative mg/dL    Bilirubin Urine Negative Negative    Ketones, Urine Negative Negative mg/dL    Specific Gravity, UA 1.015 1.005 - 1.030    Blood, Urine TRACE-INTACT Negative    pH, UA 6.0 5.0 - 9.0    Protein, UA Negative Negative mg/dL    Urobilinogen, Urine 0.2 <2.0 E.U./dL    Nitrite, Urine Negative Negative    Leukocyte Esterase, Urine TRACE (A) Negative   Microscopic Urinalysis   Result Value Ref Range    WBC, UA 1-3 0 - 5 /HPF    RBC, UA 1-3 0 - 2 /HPF    Epithelial Cells, UA FEW /HPF    Bacteria, UA FEW (A) None Seen /HPF   EKG 12 Lead   Result Value Ref Range    Ventricular Rate 102 BPM    Atrial Rate 102 BPM    P-R Interval 158 ms    QRS Duration 130 ms    Q-T Interval 366 ms    QTc Calculation (Bazett) 477 ms    P Axis 52 degrees    R Axis -27 degrees    T Axis 120 degrees       RADIOLOGY:  Interpreted by Radiologist.  XR CHEST PORTABLE    (Results Pending)   CTA PULMONARY W CONTRAST    (Results Pending)       ------------------------- NURSING NOTES AND VITALS REVIEWED ---------------------------   The nursing notes within the ED encounter and vital signs as below have been reviewed. BP (!) 127/91   Pulse 103   Temp 97.8 °F (36.6 °C) (Oral)   Resp 16   Ht 5' 8\" (1.727 m)   Wt 185 lb (83.9 kg)   SpO2 99%   BMI 28.13 kg/m²   Oxygen Saturation Interpretation: Normal      ---------------------------------------------------PHYSICAL EXAM--------------------------------------    Constitutional/General: Alert and oriented x3, well appearing, non toxic in NAD  Head: NC/AT  Eyes: PERRL, EOMI  Mouth: Oropharynx clear, handling secretions, no trismus  Neck: Supple, full ROM, no meningeal signs  Pulmonary: Lungs have a bilateral lower lung field crackles. Cardiovascular:  Regular rate and rhythm, no murmurs, gallops, or rubs. 2+ distal pulses  Abdomen: Soft, non tender, non distended,   Extremities: Moves all extremities x 4. Warm and well perfused  Skin: warm and dry without rash  Neurologic: GCS 15,  Psych: Normal Affect      ------------------------------ ED COURSE/MEDICAL DECISION MAKING----------------------  Medications   furosemide (LASIX) injection 40 mg (40 mg Intravenous Given 12/7/20 1328)         Medical Decision Making:    Straightforward    EKG: This EKG is signed and interpreted by me.     Rate: 102  Rhythm: Sinus  Interpretation: sinus tachycardia  Comparison: No changes as compared to patient's most recent EKG of 10/23/2020    Consultation: At 2:48 PM, I spoke with Dr. Nicolás Shukla (Medicine). Discussed case. They will admit this patient. We discussed the the results of the patient's elevated D-dimer. At this point, this appears to be totally cardiac in origin and the patient already had a CTA of the chest within the past month. Dr. Nicolás Shukla sees no reason to perform another CTA. I was agreeable and that the order for the CT of the chest was discontinued. Counseling: The emergency provider has spoken with the patient and discussed todays results, in addition to providing specific details for the plan of care and counseling regarding the diagnosis and prognosis. Questions are answered at this time and they are agreeable with the plan.      --------------------------------- IMPRESSION AND DISPOSITION ---------------------------------    IMPRESSION  1.  [facility], [facility]        DISPOSITION  Disposition: Transfer to [facility]  Patient condition is stable                 Guanaco Maher MD  12/08/20 4355

## 2020-12-08 LAB
ANION GAP SERPL CALCULATED.3IONS-SCNC: 10 MMOL/L (ref 7–16)
BUN BLDV-MCNC: 18 MG/DL (ref 8–23)
CALCIUM SERPL-MCNC: 9.2 MG/DL (ref 8.6–10.2)
CHLORIDE BLD-SCNC: 104 MMOL/L (ref 98–107)
CHOLESTEROL, TOTAL: 71 MG/DL (ref 0–199)
CO2: 20 MMOL/L (ref 22–29)
CREAT SERPL-MCNC: 1.1 MG/DL (ref 0.7–1.2)
EKG ATRIAL RATE: 98 BPM
EKG P AXIS: 55 DEGREES
EKG P-R INTERVAL: 156 MS
EKG Q-T INTERVAL: 398 MS
EKG QRS DURATION: 136 MS
EKG QTC CALCULATION (BAZETT): 508 MS
EKG R AXIS: -25 DEGREES
EKG T AXIS: 139 DEGREES
EKG VENTRICULAR RATE: 98 BPM
GFR AFRICAN AMERICAN: >60
GFR NON-AFRICAN AMERICAN: >60 ML/MIN/1.73
GLUCOSE BLD-MCNC: 105 MG/DL (ref 74–99)
HCT VFR BLD CALC: 39.7 % (ref 37–54)
HDLC SERPL-MCNC: 35 MG/DL
HEMOGLOBIN: 13.1 G/DL (ref 12.5–16.5)
LDL CHOLESTEROL CALCULATED: 24 MG/DL (ref 0–99)
MAGNESIUM: 2.1 MG/DL (ref 1.6–2.6)
MCH RBC QN AUTO: 29.8 PG (ref 26–35)
MCHC RBC AUTO-ENTMCNC: 33 % (ref 32–34.5)
MCV RBC AUTO: 90.4 FL (ref 80–99.9)
PDW BLD-RTO: 14.8 FL (ref 11.5–15)
PLATELET # BLD: 124 E9/L (ref 130–450)
PMV BLD AUTO: 12 FL (ref 7–12)
POTASSIUM SERPL-SCNC: 3.6 MMOL/L (ref 3.5–5)
PROCALCITONIN: 0.12 NG/ML (ref 0–0.08)
RBC # BLD: 4.39 E12/L (ref 3.8–5.8)
SODIUM BLD-SCNC: 134 MMOL/L (ref 132–146)
TRIGL SERPL-MCNC: 58 MG/DL (ref 0–149)
TSH SERPL DL<=0.05 MIU/L-ACNC: 2.04 UIU/ML (ref 0.27–4.2)
VLDLC SERPL CALC-MCNC: 12 MG/DL
WBC # BLD: 7.9 E9/L (ref 4.5–11.5)

## 2020-12-08 PROCEDURE — 6370000000 HC RX 637 (ALT 250 FOR IP): Performed by: INTERNAL MEDICINE

## 2020-12-08 PROCEDURE — 80048 BASIC METABOLIC PNL TOTAL CA: CPT

## 2020-12-08 PROCEDURE — 2580000003 HC RX 258: Performed by: INTERNAL MEDICINE

## 2020-12-08 PROCEDURE — 80061 LIPID PANEL: CPT

## 2020-12-08 PROCEDURE — 36415 COLL VENOUS BLD VENIPUNCTURE: CPT

## 2020-12-08 PROCEDURE — 2060000000 HC ICU INTERMEDIATE R&B

## 2020-12-08 PROCEDURE — 85027 COMPLETE CBC AUTOMATED: CPT

## 2020-12-08 PROCEDURE — 83735 ASSAY OF MAGNESIUM: CPT

## 2020-12-08 PROCEDURE — 2700000000 HC OXYGEN THERAPY PER DAY

## 2020-12-08 PROCEDURE — 93010 ELECTROCARDIOGRAM REPORT: CPT | Performed by: INTERNAL MEDICINE

## 2020-12-08 PROCEDURE — 6360000002 HC RX W HCPCS: Performed by: INTERNAL MEDICINE

## 2020-12-08 PROCEDURE — 84145 PROCALCITONIN (PCT): CPT

## 2020-12-08 RX ORDER — ACETAMINOPHEN 650 MG/1
650 SUPPOSITORY RECTAL EVERY 6 HOURS PRN
Status: DISCONTINUED | OUTPATIENT
Start: 2020-12-08 | End: 2020-12-12 | Stop reason: HOSPADM

## 2020-12-08 RX ORDER — SODIUM CHLORIDE 0.9 % (FLUSH) 0.9 %
10 SYRINGE (ML) INJECTION PRN
Status: DISCONTINUED | OUTPATIENT
Start: 2020-12-08 | End: 2020-12-12 | Stop reason: HOSPADM

## 2020-12-08 RX ORDER — PROMETHAZINE HYDROCHLORIDE 25 MG/1
12.5 TABLET ORAL EVERY 6 HOURS PRN
Status: DISCONTINUED | OUTPATIENT
Start: 2020-12-08 | End: 2020-12-08

## 2020-12-08 RX ORDER — VITAMIN B COMPLEX
1000 TABLET ORAL DAILY
Status: DISCONTINUED | OUTPATIENT
Start: 2020-12-08 | End: 2020-12-12 | Stop reason: HOSPADM

## 2020-12-08 RX ORDER — LEVOTHYROXINE SODIUM 0.05 MG/1
50 TABLET ORAL DAILY
Status: DISCONTINUED | OUTPATIENT
Start: 2020-12-08 | End: 2020-12-12 | Stop reason: HOSPADM

## 2020-12-08 RX ORDER — POLYETHYLENE GLYCOL 3350 17 G/17G
17 POWDER, FOR SOLUTION ORAL DAILY PRN
Status: DISCONTINUED | OUTPATIENT
Start: 2020-12-08 | End: 2020-12-12 | Stop reason: HOSPADM

## 2020-12-08 RX ORDER — ONDANSETRON 2 MG/ML
4 INJECTION INTRAMUSCULAR; INTRAVENOUS EVERY 6 HOURS PRN
Status: DISCONTINUED | OUTPATIENT
Start: 2020-12-08 | End: 2020-12-08

## 2020-12-08 RX ORDER — CARVEDILOL 3.12 MG/1
3.12 TABLET ORAL 2 TIMES DAILY WITH MEALS
Status: DISCONTINUED | OUTPATIENT
Start: 2020-12-08 | End: 2020-12-12 | Stop reason: HOSPADM

## 2020-12-08 RX ORDER — ASPIRIN 81 MG/1
81 TABLET, CHEWABLE ORAL DAILY
Status: DISCONTINUED | OUTPATIENT
Start: 2020-12-08 | End: 2020-12-12 | Stop reason: HOSPADM

## 2020-12-08 RX ORDER — POTASSIUM CHLORIDE 20 MEQ/1
40 TABLET, EXTENDED RELEASE ORAL ONCE
Status: COMPLETED | OUTPATIENT
Start: 2020-12-08 | End: 2020-12-08

## 2020-12-08 RX ORDER — PANTOPRAZOLE SODIUM 40 MG/1
40 TABLET, DELAYED RELEASE ORAL
Status: DISCONTINUED | OUTPATIENT
Start: 2020-12-08 | End: 2020-12-09

## 2020-12-08 RX ORDER — LOSARTAN POTASSIUM 50 MG/1
50 TABLET ORAL DAILY
Status: DISCONTINUED | OUTPATIENT
Start: 2020-12-08 | End: 2020-12-08

## 2020-12-08 RX ORDER — FUROSEMIDE 10 MG/ML
40 INJECTION INTRAMUSCULAR; INTRAVENOUS 2 TIMES DAILY
Status: DISCONTINUED | OUTPATIENT
Start: 2020-12-08 | End: 2020-12-09

## 2020-12-08 RX ORDER — SODIUM CHLORIDE 0.9 % (FLUSH) 0.9 %
10 SYRINGE (ML) INJECTION EVERY 12 HOURS SCHEDULED
Status: DISCONTINUED | OUTPATIENT
Start: 2020-12-08 | End: 2020-12-12 | Stop reason: HOSPADM

## 2020-12-08 RX ORDER — ACETAMINOPHEN 325 MG/1
650 TABLET ORAL EVERY 6 HOURS PRN
Status: DISCONTINUED | OUTPATIENT
Start: 2020-12-08 | End: 2020-12-12 | Stop reason: HOSPADM

## 2020-12-08 RX ADMIN — CARVEDILOL 3.12 MG: 3.12 TABLET, FILM COATED ORAL at 18:37

## 2020-12-08 RX ADMIN — POTASSIUM CHLORIDE 40 MEQ: 20 TABLET, EXTENDED RELEASE ORAL at 18:37

## 2020-12-08 RX ADMIN — Medication 10 ML: at 19:55

## 2020-12-08 RX ADMIN — POTASSIUM CHLORIDE 40 MEQ: 20 TABLET, EXTENDED RELEASE ORAL at 13:02

## 2020-12-08 RX ADMIN — FUROSEMIDE 40 MG: 10 INJECTION, SOLUTION INTRAMUSCULAR; INTRAVENOUS at 18:37

## 2020-12-08 RX ADMIN — Medication 10 ML: at 09:25

## 2020-12-08 RX ADMIN — ENOXAPARIN SODIUM 40 MG: 40 INJECTION SUBCUTANEOUS at 09:25

## 2020-12-08 RX ADMIN — LEVOTHYROXINE SODIUM 50 MCG: 50 TABLET ORAL at 06:44

## 2020-12-08 RX ADMIN — ASPIRIN 81 MG CHEWABLE TABLET 81 MG: 81 TABLET CHEWABLE at 09:24

## 2020-12-08 RX ADMIN — Medication 1000 UNITS: at 09:25

## 2020-12-08 RX ADMIN — FUROSEMIDE 40 MG: 10 INJECTION, SOLUTION INTRAMUSCULAR; INTRAVENOUS at 09:24

## 2020-12-08 ASSESSMENT — PAIN SCALES - GENERAL
PAINLEVEL_OUTOF10: 0

## 2020-12-08 NOTE — PLAN OF CARE
Problem: Safety:  Goal: Free from accidental physical injury  Description: Free from accidental physical injury  Outcome: Met This Shift     Problem: Daily Care:  Goal: Daily care needs are met  Description: Daily care needs are met  Outcome: Met This Shift     Problem: ACTIVITY INTOLERANCE/IMPAIRED MOBILITY  Goal: Mobility/activity is maintained at optimum level for patient  Outcome: Met This Shift

## 2020-12-08 NOTE — H&P
History & Physical        Reason for admission:   Cough, dyspnea, orthopnea    History Obtained From:  patient    HISTORY OF PRESENT ILLNESS:    The patient is a 69 y.o. male with history of Hth, CAD with atherectomy 1996,  who presents with 3 months of varying symptoms. Initially seen in urgent care X2, In Sept, treated for URI sx. Presented to me on 10/21. With concern about covid 19 , and hypoxia, was sent to [facility] ER (see record). COVID testing negative at that time but CT A suggested small bilateraly effusions, small foci of pneumonia. No CT evidence of PE. Interestingly at that time, proBNP was 2,038. Treated with pneumonia. F/U CXR and lab showed resolution of pneumonia but continued concerns for CHF. At that point, started on lasix and referred urgently to cardiology. For some reason, pt missed appt. Subsequently, echo obtained which showed severe LV dysfunction: 10-15% EF, Indeterminate DD, Mild AR , Moderately severe to severe MR, Mod TR, Severe pulmonary htn. Appt with cardio was rescheduled for next week. Pt called complaining of increased dyspnea, orthopnea and was advised ER. CXR in ER: The heart is enlarged.  Patchy bilateral airspace disease is noted. Francisco Lizette is    a more consolidative infiltrate seen within the right lower lobe. Francisco Lizette is    no left pleural effusion. 1. Cardiomegaly with findings of patchy bilateral airspace disease which    could represent CHF or pneumonia    2. More consolidative infiltrate seen within the right lung base favored to    represent pneumonia.           Through all of this, he has continued to cough. ACEI chaned to ARB X 1 month without improvement in cough, but he felt worsening in cardiac sx. He has switched back to fosinopril. Did not tolerate empiric trial of PPI for GERD. He clearly has PND. I am not convinced that cough is fully explained due to CHF. He is on IV diuretics with Cardiology consult pending.   Seems reasonable with intractible cough, persistent changes in left base, severe pulmonary htn to have pulmonary to see while in house as well. Past Medical History:        Diagnosis Date    CAD (coronary artery disease) 1994    ATHERECTOMY    CHF [facility]congestive heart failure) ([facility])     History of cardiovascular stress test 9/13/2012    STRESS ECHO    Hyperlipidemia     Thyroid disease        Past Surgical History:        Procedure Laterality Date    FRACTURE SURGERY      RIGHT ANKLE REPAIR       Medications Prior to Admission:    Medications Prior to Admission: furosemide (LASIX) 40 MG tablet, Take 40 mg by mouth 2 times daily  Multiple Vitamins-Minerals (MULTIVITAMIN ADULT PO), Take by mouth  levothyroxine (SYNTHROID) 50 MCG tablet, Take 50 mcg by mouth Daily  nitroGLYCERIN (NITROSTAT) 0.4 MG SL tablet, Place 0.4 mg under the tongue every 5 minutes as needed for Chest pain Dissolve 1 tab under tongue at first sign of chest pain. May repeat every 5 minutes until relief is obtained. If pain persists after taking 3 tabs in a 15-minute period, or the pain is different than is typically experienced, call 9-1-1 immediately. aspirin 81 MG tablet, Take 81 mg by mouth daily  Cholecalciferol (VITAMIN D3) 1000 units TABS, Take by mouth  fosinopril (MONOPRIL) 40 MG tablet, Take 40 mg by mouth daily   FISH OIL, by Does not apply route. Allergies:  Beta adrenergic blockers and Statins    Social History:   TOBACCO:   reports that he quit smoking about 31 years ago. He has a 20.00 pack-year smoking history. He has quit using smokeless tobacco.  ETOH:   has no history on file for alcohol. Family History:   No family history on file. REVIEW OF SYSTEMS:  CONSTITUTIONAL:  Neg   Recent weight changes,fatigue,fever,chills or night sweats  EYES:  Neg  blurriness,tearing,itching or acute change in vision  NOSE:  Neg  rhinorrhea,sneezing,itching,allergy or epistaxis  MOUTH/THROAT:  Neg  bleeding gums,hoarseness or sore throat.   RESPIRATORY: Neg Wheeze,sputum,hemoptysis or bronochitis. Pos: SOB, cough  CARDIOVASCULAR   Neg : chest pain,palpitations,paroxysmal nocturnal dyspnea. Pos: Dyspnea on exertion, orthopnea, edema  GASTROINTESTINAL:  Neg   Appetite changes,nausea,vomiting,or diarrhea,indigestion,dysphagia,change in bowel movements, or abdominal pain. GENITOURINARY:  Neg  Urinary frequency,hesitancy,urgency,polyuria,dysuria,hematuria,or incontinence. HEMATOLOGIC/LYMPHATIC:  Neg  Anemia,bleeding tendency  MUSCULOSKELETAL:  Neg   New myalgias,bone pain,joint pain,swelling or stiffness and has had no change in gait. NEUROLOGICAL:  Neg  Loss of Consciousness,memeory loss,forgetfulness,periods of confusion,decline in intellect,nervousness,insomina,aphasia or dysarthria. SKIN :  Neg  skin or hair changes,and has no itching,rashes,sores. PHYSICAL EXAM:  /81   Pulse 98   Temp 98.4 °F (36.9 °C) (Oral)   Resp 16   Ht 5' 8\" (1.727 m)   Wt 185 lb (83.9 kg)   SpO2 97%   BMI 28.13 kg/m²   General appearance: alert, appears stated age, cooperative and no distress  Head: Normocephalic, without obvious abnormality, atraumatic  Eyes: conjunctivae/corneas clear. PERRL, EOM's intact. Ears: normal external ear canals both ears  Neck: no adenopathy, no carotid bruit, no JVD, supple, symmetrical, trachea midline and thyroid not enlarged, no tenderness/mass/nodules  Lungs: Clear to A and P. Few crackles R base.   Heart: regular rate and rhythm, S1, S2 normal, no murmur, regular rate and rhythm ,no precordial heave  Abdomen:soft, non-tender; non-distended normal bowel sounds no masses, no organomegaly  Extremities:Pedal edema Trace  Homans sign is negative, no sign of DVT  Skin: Skin color, texture, turgor normal. No rashes or lesions  Neurologic:Mental status: Alert, oriented, thought content appropriate  Cranial nerves:II-XII Grossly intact  Sensory: normal  Motor:grossly normal    DATA:  Recent Labs     12/07/20  1320 12/08/20  0321   WBC 7.6 7.9 HGB 14.5 13.1   HCT 43.6 39.7   MCV 90.8 90.4    124*     Recent Labs     12/07/20  1320 12/08/20  0321    134   K 4.1 3.6   CL 97* 104   CO2 21* 20*   BUN 22 18   CREATININE 1.2 1.1   GLUCOSE 131* 105*     No results for input(s): AST, ALT, ALB, BILIDIR, BILITOT, ALKPHOS in the last 72 hours.   Recent Labs     12/07/20  1320 12/07/20  1830   TROPONINI <0.01 <0.01     No results found for: INR, PROTIME     CBC with Differential:    Lab Results   Component Value Date    WBC 7.9 12/08/2020    RBC 4.39 12/08/2020    HGB 13.1 12/08/2020    HCT 39.7 12/08/2020     12/08/2020    MCV 90.4 12/08/2020    MCH 29.8 12/08/2020    MCHC 33.0 12/08/2020    RDW 14.8 12/08/2020    LYMPHOPCT 14.9 12/07/2020    MONOPCT 8.2 12/07/2020    EOSPCT 2.0 07/23/2019    BASOPCT 0.4 12/07/2020    MONOSABS 0.62 12/07/2020    LYMPHSABS 1.13 12/07/2020    EOSABS 0.04 12/07/2020    BASOSABS 0.03 12/07/2020     CMP:    Lab Results   Component Value Date     12/08/2020    K 3.6 12/08/2020    K 4.1 12/07/2020     12/08/2020    CO2 20 12/08/2020    BUN 18 12/08/2020    CREATININE 1.1 12/08/2020    GFRAA >60 12/08/2020    LABGLOM >60 12/08/2020    GLUCOSE 105 12/08/2020    LABALBU 4.1 07/23/2019    CALCIUM 9.2 12/08/2020    BILITOT 0.8 07/23/2019    ALKPHOS 44 07/23/2019    AST 15 07/23/2019    ALT 24 07/23/2019     Magnesium:    Lab Results   Component Value Date    MG 2.1 12/08/2020     Phosphorus:  No results found for: PHOS  Troponin:    Lab Results   Component Value Date    TROPONINI <0.01 12/07/2020     U/A:    Lab Results   Component Value Date    COLORU Yellow 12/07/2020    PHUR 6.0 12/07/2020    WBCUA 1-3 12/07/2020    RBCUA 1-3 12/07/2020    BACTERIA FEW 12/07/2020    CLARITYU Clear 12/07/2020    SPECGRAV 1.015 12/07/2020    LEUKOCYTESUR TRACE 12/07/2020    UROBILINOGEN 0.2 12/07/2020    BILIRUBINUR Negative 12/07/2020    BILIRUBINUR Negative 07/23/2019    BLOODU TRACE-INTACT 12/07/2020    GLUCOSEU Negative 12/07/2020     ABG:  No results found for: PHART, EMC9HOS, PO2ART, HPE4YEY, BEART, THGBART, KJK9AIY, M9AUEOUA       RADIOLOGY:   No orders to display       ASSESSMENT   CHF-severe systolic dysfunction, severe pulm htn, mod severe to severe MR, mod TR on echo 12/3/20 [facility]. Hx CAD with atherectomy 1996  Hypertension  Chronic cough- ACEI changed to ARB X1 month, no improvement. Did not tolerate PPI.          Patient Active Problem List   Diagnosis Code    Hypothyroid E03.9    Benign essential hypertension I10    Abnormal echocardiogram R93.1    CHF (congestive heart failure), NYHA class I, acute on chronic, combined ([facility] [address] I50.43     PLAN:  Started on IV lasix 40 mg bid. Await cardio input. Will need EP etc... Will ask pulmonary to see as well re: chronic cough, R base infiltrate , severe pulmonary htn. Follow labs closely.       Disposition:  home      Electronically signed by Jake Panda MD on 12/8/2020 at 7:29 AM

## 2020-12-08 NOTE — PROGRESS NOTES
Pharmacy Note    An order for Zofran and/or Phenergan has been discontinued for this patient due to the risk of QT prolongation. If an antiemetic is indicated for your patient, please consider use of Tigan or Compazine. Current QTc = 508  Please contact the Pharmacy with any questions.   Carlita Dale, [id number] [facility]  12/8/2020  12:10 AM

## 2020-12-08 NOTE — PROGRESS NOTES
Attending Physician Attestation: Dr. Pino Rojas    Thank you very much for allowing me to see this patient in consultation and follow up. Physical Examination:  Vitals:   Vitals:    12/07/20 2330 12/08/20 0841 12/08/20 0915   BP: 113/81 100/69    Pulse: 98 95    Resp: 16 16    Temp: 98.4 °F (36.9 °C) 98.9 °F (37.2 °C)    TempSrc: Oral Oral    SpO2: 97% 95% 93%   Weight: 185 lb (83.9 kg)     Height: 5' 8\" (1.727 m)        General: No Acute Distress  HEENT: normocephalic, atruamatic  CVS: RRR, S1, S2, No S3 or S4  Respiratory: decreased breath sounds at lung bases, no focal wheezes noted  Abdomen: soft, NT, ND  Extremities: no clubbing/cyanosis/edema    ASSESSMENT:  1.) Multi-Factorial Dyspnea   2.) Valvular Heart Disease - mild AR, moderate/severe MR, moderate TR  3.) HFrEF with EF 10-15%, Severe LV Dysfunction  4.) Severe Pulmonary Hypertension   5.) B/L Pleural Effusion  6.) Subsegmental Atelectasis     - outpatient PFT, PSG  - diuresis  - await input from cardiology, EP  - CHF and valvular heart disease is of concern  - IS, O2  - CHF clinic   - procalcitonin  - COVID negative  - hold abx at this time     Thank you for allowing me to participate in the care of this patient. DEMETRIUS Zimmer  12/8/2020  9:36 AM

## 2020-12-08 NOTE — CONSULTS
Pulmonary Consultation    Admit Date: 12/7/2020  Requesting Physician: Miesha Davis MD    CC:  dyspnea     HPI:  69 YOM presents to ER for increasing dyspnea. He has no underlying lung disease. 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 Smoked a pack per day for 20 years and stopped over 40 years ago. Has been active and without issues. Had a heart attack in 1994 and received an arthrectomy at that time. Has followed and had stress test and echo for years. Developed \"bronchitis\" in September and was treated at urgent care with an antibiotic. He did get better for a few weeks then it came back, seen another urgent care provider who treated again with a different antibiotic. Went a few weeks later to hi PCP who sent him to ER in October where he was treated with steroid and antibiotic for RLL pneumonia. Went back to PCP in a few weeks for CXR that showed resolution of RLL pneumonia. He then had progressing dyspnea after this. He has had 3-4 Covid tests all of which were negative with no known exposures He cannot lay flat or he begins to cough. He has an intermittent cough for white foamy and sometimes pink tinges mucous. He is more dyspneic with exertion than at rest. It is really relieved by nothing. Has no fever or chills and no other associated symptoms . PMH:    Past Medical History:   Diagnosis Date    CAD (coronary artery disease) 1994    ATHERECTOMY    [facility]) [facility]     History of cardiovascular stress test 9/13/2012    STRESS ECHO    Hyperlipidemia     Thyroid disease      PSH:    Past Surgical History:   Procedure Laterality Date    FRACTURE SURGERY      RIGHT ANKLE REPAIR          Respiratory ROS: dyspneic on exertion and occasionally at rest. Productive cough for frothy occasionally pink tinged mucous. Otherwise, a complete review of systems is undertaken and is negative.     Social History:  Social History Socioeconomic History    Marital status:      Spouse name: Not on file    Number of children: Not on file    Years of education: Not on file    Highest education level: Not on file   Occupational History    Not on file   Social Needs    Financial resource strain: Not on file    Food insecurity     Worry: Not on file     Inability: Not on file    Transportation needs     Medical: Not on file     Non-medical: Not on file   Tobacco Use    Smoking status: Former Smoker     Packs/day: 1.00     Years: 20.00     Pack years: 20.00     Last attempt to quit:      Years since quittin.9    Smokeless tobacco: Former User   Substance and Sexual Activity    Alcohol use: Not on file    Drug use: Not on file    Sexual activity: Not on file   Lifestyle    Physical activity     Days per week: Not on file     Minutes per session: Not on file    Stress: Not on file   Relationships    Social connections     Talks on phone: Not on file     Gets together: Not on file     Attends [employer] service: Not on file     Active member of club or organization: Not on file     Attends meetings of clubs or organizations: Not on file     Relationship status: Not on file    Intimate partner violence     Fear of current or ex partner: Not on file     Emotionally abused: Not on file     Physically abused: Not on file     Forced sexual activity: Not on file   Other Topics Concern    Not on file   Social History Narrative    Not on file       Medications:       Vitamin D  1,000 Units Oral Daily    enoxaparin  40 mg Subcutaneous Daily    sodium chloride flush  10 mL Intravenous 2 times per day    pantoprazole  40 mg Oral QAM AC    levothyroxine  50 mcg Oral Daily    furosemide  40 mg Intravenous BID    aspirin  81 mg Oral Daily    [START ON 2020] sacubitril-valsartan  1 tablet Oral BID    carvedilol  3.125 mg Oral BID WC         Vitals:    VITALS:  /69   Pulse 95   Temp 98.9 °F (37.2 °C) (Oral) Resp 16   Ht 5' 8\" (1.727 m)   Wt 185 lb (83.9 kg)   SpO2 95%   BMI 28.13 kg/m²   24HR INTAKE/OUTPUT:  No intake or output data in the 24 hours ending 20  CURRENT PULSE OXIMETRY:  SpO2: 95 %  24HR PULSE OXIMETRY RANGE:  SpO2  Av.2 %  Min: 95 %  Max: 99 %      EXAM:  General: No distress. Alert. Eyes: PERRL. No sclera icterus. No conjunctival injection. ENT: No discharge. Pharynx clear. crowded airway  Neck: Trachea midline. Normal thyroid. Resp: No accessory muscle use. Non-labored. Diminished on the right with a few crackles. CV: Regular rate. Regular rhythm. No mumur     ABD: Non-tender. Non-distended. No masses. No organmegaly. Normal bowel sounds. Skin: Warm and dry. No nodule on exposed extremities. No rash on exposed extremities. Lymph: No cervical LAD. No supraclavicular LAD. Ext: No joint deformity. No clubbing. No cyanosis. No edema  Neuro: Awake. Follows commands. Positive pupils/gag/corneals. Normal pain response. Psych: pleasant and interactive      Lab Results:  CBC:   Recent Labs     20  1320 20  0321   WBC 7.6 7.9   HGB 14.5 13.1   HCT 43.6 39.7   MCV 90.8 90.4    124*     BMP:   Recent Labs     20  1320 20  0321    134   K 4.1 3.6   CL 97* 104   CO2 21* 20*   BUN 22 18   CREATININE 1.2 1.1     LFT: No results for input(s): ALKPHOS, ALT, AST, PROT, BILITOT, BILIDIR, LABALBU in the last 72 hours. PT/INR: No results for input(s): PROTIME, INR in the last 72 hours. Cultures:  Results for Fernanda Greenfield (MRN [id number]) as of 2020 09:27   Ref.  Range 2020 13:58   Influenza A by PCR Latest Ref Range: Not Detected  Not Detected   Influenza B by PCR Latest Ref Range: Not Detected  Not Detected   Adenovirus by PCR Latest Ref Range: Not Detected  Not Detected   Coronavirus 229E by PCR Latest Ref Range: Not Detected  Not Detected   Coronavirus HKU1 by PCR Latest Ref Range: Not Detected  Not Detected   Coronavirus NL63 by PCR Latest Ref Range: Not Detected  Not Detected   Coronavirus OC43 by PCR Latest Ref Range: Not Detected  Not Detected   Human Metapneumovirus by PCR Latest Ref Range: Not Detected  Not Detected   Human Rhinovirus/Enterovirus by PCR Latest Ref Range: Not Detected  Not Detected   Parainfluenza Virus 1 by PCR Latest Ref Range: Not Detected  Not Detected   Parainfluenza Virus 2 by PCR Latest Ref Range: Not Detected  Not Detected   Parainfluenza Virus 3 by PCR Latest Ref Range: Not Detected  Not Detected   Parainfluenza Virus 4 by PCR Latest Ref Range: Not Detected  Not Detected   Respiratory Syncytial Virus by PCR Latest Ref Range: Not Detected  Not Detected   Bordetella parapertussis by PCR Latest Ref Range: Not Detected  Not Detected   Chlamydophilia pneumoniae by PCR Latest Ref Range: Not Detected  Not Detected   Mycoplasma pneumoniae by PCR Latest Ref Range: Not Detected  Not Detected   SARS-CoV-2, PCR Latest Ref Range: Not Detected  Not Detected   Bordetella pertussis by PCR Latest Ref Range: Not Detected  Not Detected       ABG: na  Films:  Xr Chest Portable    Result Date: 12/7/2020  EXAMINATION: ONE XRAY VIEW OF THE CHEST 12/7/2020 2:33 pm COMPARISON: None. HISTORY: ORDERING SYSTEM PROVIDED HISTORY: Shortness of breath TECHNOLOGIST PROVIDED HISTORY: Reason for exam:->Shortness of breath FINDINGS: The heart is enlarged. Patchy bilateral airspace disease is noted. There is a more consolidative infiltrate seen within the right lower lobe. There is no left pleural effusion. 1. Cardiomegaly with findings of patchy bilateral airspace disease which could represent CHF or pneumonia 2. More consolidative infiltrate seen within the right lung base favored to represent pneumonia. Echo not available but reported from Dr. Chelsea Goldsmith note as :  severe LV dysfunction: 10-15% EF, Indeterminate DD, Mild AR , Moderately severe to severe MR, Mod TR, Severe pulmonary htn.     Assessment:  · Dyspnea  · Volume overload  · Bilateral pleural effusion R>L      Plan:  · Keep POX 90-95%, o2 as needed currently on room air  · Cardiology consulted, on IV lasix for now , pro BNP 4,547, await input   · Obtain a procalcitonin, r/o pneumonia doubt as WBC normal, afebrile   · IS for pulmonary hygiene   · Most likely will needs outpatient PFT and PSG       Thanks for letting us see this patient in consultation. Please contact us with any questions.       Electronically signed by SHONNA Reid on 12/8/2020 at 9:21 AM     Attending Physician Attestation: Dr. Cassidy Lenz you very much for allowing me to see this patient in consultation and follow up.     Physical Examination:  Vitals:   Vitals         Vitals:     12/07/20 2330 12/08/20 0841 12/08/20 0915   BP: 113/81 100/69     Pulse: 98 95     Resp: 16 16     Temp: 98.4 °F (36.9 °C) 98.9 °F (37.2 °C)     TempSrc: Oral Oral     SpO2: 97% 95% 93%   Weight: 185 lb (83.9 kg)       Height: 5' 8\" (1.727 m)             General: No Acute Distress  HEENT: normocephalic, atruamatic  CVS: RRR, S1, S2, No S3 or S4  Respiratory: decreased breath sounds at lung bases, no focal wheezes noted  Abdomen: soft, NT, ND  Extremities: no clubbing/cyanosis/edema     ASSESSMENT:  1.) Multi-Factorial Dyspnea   2.) Valvular Heart Disease - mild AR, moderate/severe MR, moderate TR  3.) HFrEF with EF 10-15%, Severe LV Dysfunction  4.) Severe Pulmonary Hypertension   5.) B/L Pleural Effusion  6.) Subsegmental Atelectasis      - outpatient PFT, PSG  - diuresis  - await input from cardiology, EP  - CHF and valvular heart disease is of concern  - IS, O2  - CHF clinic   - procalcitonin  - COVID negative  - hold abx at this time      Thank you for allowing me to participate in the care of this patient.  Juliana Garsia M.D.  Juliana Garsia  12/8/2020  9:36 AM

## 2020-12-08 NOTE — CONSULTS
[facility] cardiology/the [address]    Name: Matthieu Gabriel    Age: 69 y.o. Date of Admission: 12/7/2020 11:24 PM    Date of Service: 12/8/2020    Reason for Consultation: Progressive dyspnea on exertion and echocardiogram my office December 3, 2020 showed global LVEF 15%, mild AI, 3+ MR, 2+ TR, RVSP 75 mmHg consistent with severe pulmonary hypertension. Referring Physician: Dr. Scarlet Massey    History of Present Illness: The patient is a 69y.o. year old male with a reported history of CAD and prior myocardial infarction in approximately 1994 and by his report underwent an atherectomy at [facility] at that time. He reports over the last couple of months progressive dyspnea on exertion and evolving chest x-ray with question of pneumonia versus pulmonary edema he had received antibiotics about 3 to 4 months ago and somewhat improved but then seemed to get worse with progressive dyspnea on exertion, denies any chest pain or unstable angina. He reports a month ago he was started on furosemide 40 mg twice a day and he diuresed a limited amount. No indication of hemoptysis or other bleeding. Past Medical History: CAD and atherectomy [facility]. Past surgical history: Left ankle fracture surgical repair      Review of Systems:     Constitutional: No fever, chills, sweats  Cardiac: As per HPI  Pulmonary: No cough, wheeze, hemoptysis  HEENT: No visual disturbances or difficult swallowing  GI: No nausea, vomiting, diarrhea, abdominal pain, rectal bleeding  : No dysuria or hematuria  Endocrine: No excessive thirst, heat or cold intolerance. Musculoskeletal: No joint pain or muscle aches. No claudication  Skin: No skin breakdown or rashes  Neuro: No headache, confusion, or seizures  Psych: No depression, anxiety      Family History:  No family history on file.     Social History:  Social History     Socioeconomic History    Marital status:      Spouse name: Not on file    Number of children: Not on file    Years of education: Not on file    Highest education level: Not on file   Occupational History    Not on file   Social Needs    Financial resource strain: Not on file    Food insecurity     Worry: Not on file     Inability: Not on file    Transportation needs     Medical: Not on file     Non-medical: Not on file   Tobacco Use    Smoking status: Former Smoker     Packs/day: 1.00     Years: 20.00     Pack years: 20.00     Last attempt to quit:      Years since quittin.9    Smokeless tobacco: Former User   Substance and Sexual Activity    Alcohol use: Not on file    Drug use: Not on file    Sexual activity: Not on file   Lifestyle    Physical activity     Days per week: Not on file     Minutes per session: Not on file    Stress: Not on file   Relationships    Social connections     Talks on phone: Not on file     Gets together: Not on file     Attends [employer] service: Not on file     Active member of club or organization: Not on file     Attends meetings of clubs or organizations: Not on file     Relationship status: Not on file    Intimate partner violence     Fear of current or ex partner: Not on file     Emotionally abused: Not on file     Physically abused: Not on file     Forced sexual activity: Not on file   Other Topics Concern    Not on file   Social History Narrative    Not on file       Allergies:   Allergies   Allergen Reactions    Beta Adrenergic Blockers Other (See Comments)     Disorientation, blood pressure increased      Statins Other (See Comments)     Cramping         Current Medications:  Current Facility-Administered Medications   Medication Dose Route Frequency Provider Last Rate Last Dose    Vitamin D (CHOLECALCIFEROL) tablet 1,000 Units  1,000 Units Oral Daily Meredith Barnett MD   1,000 Units at 20 0925    enoxaparin (LOVENOX) injection 40 mg  40 mg Subcutaneous Daily Meredith Barnett MD 40 mg at 12/08/20 0925    sodium chloride flush 0.9 % injection 10 mL  10 mL Intravenous 2 times per day Kenneth Samaniego MD   10 mL at 12/08/20 0925    acetaminophen (TYLENOL) tablet 650 mg  650 mg Oral Q6H PRN Kenneth Samaniego MD        Or   [place] acetaminophen (TYLENOL) suppository 650 mg  650 mg Rectal Q6H PRN Kenneth Samaniego MD        polyethylene glycol [facility]) packet 17 g  17 g Oral Daily PRN Kenneth Samaniego MD        sodium chloride flush 0.9 % injection 10 mL  10 mL Intravenous PRN Kenneth Samaniego MD        pantoprazole (PROTONIX) tablet 40 mg  40 mg Oral QAM AC Kenneth Samaniego MD        levothyroxine (SYNTHROID) tablet 50 mcg  50 mcg Oral Daily Kenneth Samaniego MD   50 mcg at 12/08/20 0644    furosemide (LASIX) injection 40 mg  40 mg Intravenous BID Kenneth Samaniego MD   40 mg at 12/08/20 6921    aspirin chewable tablet 81 mg  81 mg Oral Daily Kenneth Samaniego MD   81 mg at 12/08/20 9903    [START ON 12/9/2020] sacubitril-valsartan (ENTRESTO) 24-26 MG per tablet 1 tablet  1 tablet Oral BID Olga Lidia Castro MD        carvedilol (COREG) tablet 3.125 mg  3.125 mg Oral BID  Olga Lidia Castro MD        potassium chloride (KLOR-CON M) extended release tablet 40 mEq  40 mEq Oral Once Olga Lidia Castro MD        potassium chloride (KLOR-CON M) extended release tablet 40 mEq  40 mEq Oral Once Olga Lidia Castro MD             Physical Exam:  /69   Pulse 95   Temp 98.9 °F (37.2 °C) (Oral)   Resp 16   Ht 5' 8\" (1.727 m)   Wt 185 lb (83.9 kg)   SpO2 93%   BMI 28.13 kg/m²   Weight change: Wt Readings from Last 3 Encounters:   12/07/20 185 lb (83.9 kg)   12/07/20 185 lb (83.9 kg)   10/21/20 188 lb (85.3 kg)         General: Awake, alert, oriented x3, no acute distress  HEENT: Unremarkable  Neck: No JVD or bruits. Cardiac: Regular rate and rhythm, normal S1 and S2, no extra heart sounds, murmurs, heaves, thrills  Resp: Lungs clear without wheezing or crackles.  No accessory muscle use or retraction  Abdomen: soft, nontender, nondistended, no gross organomegaly or mass  Skin: Warm and dry, no cyanosis. Musculoskeletal: normal tone and strength in the upper and lower extremities bilaterally  Neuro: Grossly unremarkable  Psych: Cooperative, and normal affect    Intake/Output:    Intake/Output Summary (Last 24 hours) at 12/8/2020 1251  Last data filed at 12/8/2020 1211  Gross per 24 hour   Intake 710 ml   Output 550 ml   Net 160 ml     I/O this shift: In: 710 [P.O.:710]  Out: 550 [Urine:550]    Laboratory Tests:  Lab Results   Component Value Date    CREATININE 1.1 12/08/2020    BUN 18 12/08/2020     12/08/2020    K 3.6 12/08/2020     12/08/2020    CO2 20 (L) 12/08/2020     No results for input(s): CKTOTAL, CKMB in the last 72 hours. Invalid input(s): TROPONONI  No results found for: BNP  Lab Results   Component Value Date    WBC 7.9 12/08/2020    RBC 4.39 12/08/2020    HGB 13.1 12/08/2020    HCT 39.7 12/08/2020    MCV 90.4 12/08/2020    MCH 29.8 12/08/2020    MCHC 33.0 12/08/2020    RDW 14.8 12/08/2020     12/08/2020    MPV 12.0 12/08/2020     No results for input(s): ALKPHOS, ALT, AST, PROT, BILITOT, BILIDIR, LABALBU in the last 72 hours. Lab Results   Component Value Date    MG 2.1 12/08/2020     No results found for: PROTIME, INR  Lab Results   Component Value Date    TSH 2.040 12/07/2020     No components found for: CHLPL  Lab Results   Component Value Date    TRIG 58 12/08/2020    TRIG 102 07/23/2019    TRIG 169 01/16/2019     Lab Results   Component Value Date    HDL 35 12/08/2020    HDL 46 07/23/2019    HDL 45 01/16/2019     Lab Results   Component Value Date    LDLCALC 24 12/08/2020    LDLCALC 135 07/23/2019    LDLCALC 160 01/16/2019     No results found for: INR    Admission ECG personally reviewed by me revealed normal sinus rhythm left bundle branch block    I personally reviewed his telemetry and it shows sinus tachycardia heart rate 101 with a left bundle branch block.     He also had a 6 beat run of a wide-complex tachycardia consistent with monomorphic VT.    ASSESSMENT / PLAN:    1. Acute systolic CHF and potentially progressive symptoms over the last couple of months and no reported clear-cut unstable angina. Would stop ACE inhibitor and in place start Entresto 24/26 mg twice a day, very low-dose carvedilol 3.125 mg twice a day and IV Lasix 40 mg twice a day. Discussed with him in the future consideration of possible heart catheterization for definitive evaluation and extent of CAD with known history of prior myocardial infarction CAD with atherectomy 1994 at [facility] per his report. CHF education reviewed with him including sodium restricted diet. Check weight on a regular basis and be conscientious about medical compliance. #2 nonsustained VT appears to be monomorphic 6 beats and would replace potassium to greater than 4 and will receive 80 mEq of p.o. potassium today as level was 3.6 this morning but active diuresis with IV diuretic. #3 chronic CAD prior myocardial infarction 1994. Continue aspirin 81 mg daily, carvedilol 3.125 mg twice a day, Praluent for lipid management. He reports his numbers have dropped 60% on this as it was approved by the [facility]. #4 3+ MR and 2+ TR and continue afterload reduction. Blood pressure is already somewhat well controlled and at times even low. #5 severe pulmonary hypertension and continue afterload reduction optimizing blood pressure. Here in the hospital systolic blood pressure 683 and 100. Diastolic blood pressure 81 and 69.    #6 reported beta-blocker allergy as he reports his blood pressure went up with prior beta-blocker use which have never actually seen. I reassured him carvedilol is more for heart failure management and starting a trivial dose 3.125 mg twice a day. In this gentleman in 185 pounds top dose is 50 mg twice a day. Ambulate on telemetry.   Advance activity and may be able to be discharged in the next couple of days.    Electronically signed by Chata Birch MD on 12/8/2020 at 12:51 PM

## 2020-12-09 LAB
ANION GAP SERPL CALCULATED.3IONS-SCNC: 11 MMOL/L (ref 7–16)
BUN BLDV-MCNC: 22 MG/DL (ref 8–23)
CALCIUM SERPL-MCNC: 9.5 MG/DL (ref 8.6–10.2)
CHLORIDE BLD-SCNC: 102 MMOL/L (ref 98–107)
CO2: 21 MMOL/L (ref 22–29)
CREAT SERPL-MCNC: 1.3 MG/DL (ref 0.7–1.2)
GFR AFRICAN AMERICAN: >60
GFR NON-AFRICAN AMERICAN: 54 ML/MIN/1.73
GLUCOSE BLD-MCNC: 123 MG/DL (ref 74–99)
MAGNESIUM: 2.1 MG/DL (ref 1.6–2.6)
POTASSIUM SERPL-SCNC: 4.2 MMOL/L (ref 3.5–5)
PROCALCITONIN: 0.12 NG/ML (ref 0–0.08)
SODIUM BLD-SCNC: 134 MMOL/L (ref 132–146)

## 2020-12-09 PROCEDURE — 83735 ASSAY OF MAGNESIUM: CPT

## 2020-12-09 PROCEDURE — 6370000000 HC RX 637 (ALT 250 FOR IP): Performed by: INTERNAL MEDICINE

## 2020-12-09 PROCEDURE — 2060000000 HC ICU INTERMEDIATE R&B

## 2020-12-09 PROCEDURE — 6360000002 HC RX W HCPCS: Performed by: INTERNAL MEDICINE

## 2020-12-09 PROCEDURE — 2580000003 HC RX 258: Performed by: INTERNAL MEDICINE

## 2020-12-09 PROCEDURE — 84145 PROCALCITONIN (PCT): CPT

## 2020-12-09 PROCEDURE — 36415 COLL VENOUS BLD VENIPUNCTURE: CPT

## 2020-12-09 PROCEDURE — 80048 BASIC METABOLIC PNL TOTAL CA: CPT

## 2020-12-09 RX ORDER — FUROSEMIDE 10 MG/ML
20 INJECTION INTRAMUSCULAR; INTRAVENOUS 2 TIMES DAILY
Status: DISCONTINUED | OUTPATIENT
Start: 2020-12-09 | End: 2020-12-10

## 2020-12-09 RX ORDER — 0.9 % SODIUM CHLORIDE 0.9 %
500 INTRAVENOUS SOLUTION INTRAVENOUS ONCE
Status: COMPLETED | OUTPATIENT
Start: 2020-12-09 | End: 2020-12-09

## 2020-12-09 RX ADMIN — ENOXAPARIN SODIUM 40 MG: 40 INJECTION SUBCUTANEOUS at 08:55

## 2020-12-09 RX ADMIN — SODIUM CHLORIDE 500 ML: 9 INJECTION, SOLUTION INTRAVENOUS at 00:18

## 2020-12-09 RX ADMIN — CARVEDILOL 3.12 MG: 3.12 TABLET, FILM COATED ORAL at 16:39

## 2020-12-09 RX ADMIN — Medication 10 ML: at 20:03

## 2020-12-09 RX ADMIN — LEVOTHYROXINE SODIUM 50 MCG: 50 TABLET ORAL at 06:19

## 2020-12-09 RX ADMIN — ASPIRIN 81 MG CHEWABLE TABLET 81 MG: 81 TABLET CHEWABLE at 08:55

## 2020-12-09 RX ADMIN — SACUBITRIL AND VALSARTAN 1 TABLET: 24; 26 TABLET, FILM COATED ORAL at 08:55

## 2020-12-09 RX ADMIN — Medication 10 ML: at 08:55

## 2020-12-09 RX ADMIN — SACUBITRIL AND VALSARTAN 1 TABLET: 24; 26 TABLET, FILM COATED ORAL at 20:03

## 2020-12-09 RX ADMIN — Medication 1000 UNITS: at 08:54

## 2020-12-09 RX ADMIN — FUROSEMIDE 20 MG: 10 INJECTION, SOLUTION INTRAMUSCULAR; INTRAVENOUS at 08:55

## 2020-12-09 ASSESSMENT — PAIN SCALES - GENERAL
PAINLEVEL_OUTOF10: 0

## 2020-12-09 NOTE — PLAN OF CARE
Problem: Safety:  Goal: Free from accidental physical injury  Description: Free from accidental physical injury  Outcome: Met This Shift  Goal: Free from intentional harm  Description: Free from intentional harm  Outcome: Met This Shift     Problem: Daily Care:  Goal: Daily care needs are met  Description: Daily care needs are met  Outcome: Met This Shift     Problem: Discharge Planning:  Goal: Patients continuum of care needs are met  Description: Patients continuum of care needs are met  Outcome: Met This Shift     Problem: OXYGENATION/RESPIRATORY FUNCTION  Goal: Patient will maintain patent airway  Outcome: Met This Shift  Goal: Patient will achieve/maintain normal respiratory rate/effort  Description: Respiratory rate and effort will be within normal limits for the patient  Outcome: Met This Shift     Problem: HEMODYNAMIC STATUS  Goal: Patient has stable vital signs and fluid balance  Outcome: Met This Shift     Problem: FLUID AND ELECTROLYTE IMBALANCE  Goal: Fluid and electrolyte balance are achieved/maintained  Outcome: Met This Shift     Problem: ACTIVITY INTOLERANCE/IMPAIRED MOBILITY  Goal: Mobility/activity is maintained at optimum level for patient  Outcome: Met This Shift     Problem: Falls - Risk of:  Goal: Will remain free from falls  Description: Will remain free from falls  Outcome: Met This Shift  Goal: Absence of physical injury  Description: Absence of physical injury  Outcome: Met This Shift

## 2020-12-09 NOTE — PROGRESS NOTES
Pulmonary Progress Note    Admit Date: 2020                            PCP: Evaristo Linares MD  Active Problems:    CHF (congestive heart failure), NYHA class I, acute on chronic, combined ([address]  Resolved Problems:    * No resolved hospital problems. *      Subjective:  Up in bathroom getting washed up on room air. Some dyspnea not worse. Had a rough day yesterday with low blood pressure. Ready to go home     Medications:       furosemide  20 mg Intravenous BID    Vitamin D  1,000 Units Oral Daily    enoxaparin  40 mg Subcutaneous Daily    sodium chloride flush  10 mL Intravenous 2 times per day    levothyroxine  50 mcg Oral Daily    aspirin  81 mg Oral Daily    sacubitril-valsartan  1 tablet Oral BID    carvedilol  3.125 mg Oral BID WC       Vitals:  VITALS:  /71   Pulse 102   Temp 97.4 °F (36.3 °C) (Axillary)   Resp 18   Ht 5' 8\" (1.727 m)   Wt 185 lb (83.9 kg)   SpO2 98%   BMI 28.13 kg/m²   24HR INTAKE/OUTPUT:      Intake/Output Summary (Last 24 hours) at 2020 1017  Last data filed at 2020 0641  Gross per 24 hour   Intake 530 ml   Output 2200 ml   Net -1670 ml     CURRENT PULSE OXIMETRY:  SpO2: 98 %  24HR PULSE OXIMETRY RANGE:  SpO2  Av.9 %  Min: 93 %  Max: 98 %  CVP:    VENT SETTINGS:   Vent Information  SpO2: 98 %  Additional Respiratory  Assessments  Pulse: 102  Resp: 18  SpO2: 98 %      EXAM:  General: Alert, in NAD  ENT: No discharge. Pharynx clear. membranes moist   Neck: Supple, Trachea midline. Resp: No accessory muscle use. Non-labored. Lungs clear diminshed on right with No crackles. No wheezing. No rhonchi. CV: Regular rate. Regular rhythm. No murmur . No edema. ABD: Non-tender. Non-distended. Soft, round . Normal bowel sounds. Skin: Warm and dry. M/S: No cyanosis. No joint deformity. No clubbing. Neuro: Awake. Follows commands. O x 3, SIMMONS  Psych:  calm and interactive     I/O: I/O last 3 completed shifts:   In: 710 [P.O.:710]  Out: 2200 [YKNJD:1848]  No intake/output data recorded. Results:  CBC:   Recent Labs     12/07/20  1320 12/08/20  0321   WBC 7.6 7.9   HGB 14.5 13.1   HCT 43.6 39.7   MCV 90.8 90.4    124*     BMP:   Recent Labs     12/07/20  1320 12/08/20  0321 12/09/20  0525    134 134   K 4.1 3.6 4.2   CL 97* 104 102   CO2 21* 20* 21*   BUN 22 18 22   CREATININE 1.2 1.1 1.3*     LFT: No results for input(s): ALKPHOS, ALT, AST, PROT, BILITOT, BILIDIR, LABALBU in the last 72 hours. PT/INR: No results for input(s): PROTIME, INR in the last 72 hours. Procalcitonin:   Recent Labs     12/09/20  0525   PROCAL 0.12*     Cultures:  Results for Brenna Sidhu (MRN [id number]) as of 12/8/2020 09:27    Ref.  Range 12/7/2020 13:58   Influenza A by PCR Latest Ref Range: Not Detected  Not Detected   Influenza B by PCR Latest Ref Range: Not Detected  Not Detected   Adenovirus by PCR Latest Ref Range: Not Detected  Not Detected   Coronavirus 229E by PCR Latest Ref Range: Not Detected  Not Detected   Coronavirus HKU1 by PCR Latest Ref Range: Not Detected  Not Detected   Coronavirus NL63 by PCR Latest Ref Range: Not Detected  Not Detected   Coronavirus OC43 by PCR Latest Ref Range: Not Detected  Not Detected   Human Metapneumovirus by PCR Latest Ref Range: Not Detected  Not Detected   Human Rhinovirus/Enterovirus by PCR Latest Ref Range: Not Detected  Not Detected   Parainfluenza Virus 1 by PCR Latest Ref Range: Not Detected  Not Detected   Parainfluenza Virus 2 by PCR Latest Ref Range: Not Detected  Not Detected   Parainfluenza Virus 3 by PCR Latest Ref Range: Not Detected  Not Detected   Parainfluenza Virus 4 by PCR Latest Ref Range: Not Detected  Not Detected   Respiratory Syncytial Virus by PCR Latest Ref Range: Not Detected  Not Detected   Bordetella parapertussis by PCR Latest Ref Range: Not Detected  Not Detected   Chlamydophilia pneumoniae by PCR Latest Ref Range: Not Detected  Not Detected   Mycoplasma pneumoniae by PCR Latest Ref Range: Not Detected  Not Detected   SARS-CoV-2, PCR Latest Ref Range: Not Detected  Not Detected   Bordetella pertussis by PCR Latest Ref Range: Not Detected  Not Detected            ABG:   No results for input(s): PH, PO2, PCO2, HCO3, BE, O2SAT in the last 72 hours. Films:  Xr Chest Portable    Result Date: 12/7/2020  EXAMINATION: ONE XRAY VIEW OF THE CHEST 12/7/2020 2:33 pm COMPARISON: None. HISTORY: ORDERING SYSTEM PROVIDED HISTORY: Shortness of breath TECHNOLOGIST PROVIDED HISTORY: Reason for exam:->Shortness of breath FINDINGS: The heart is enlarged. Patchy bilateral airspace disease is noted. There is a more consolidative infiltrate seen within the right lower lobe. There is no left pleural effusion. 1. Cardiomegaly with findings of patchy bilateral airspace disease which could represent CHF or pneumonia 2. More consolidative infiltrate seen within the right lung base favored to represent pneumonia.              Echo not available but reported from Dr. Lisa Abbott note as :  severe LV dysfunction: 10-15% EF, Indeterminate DD, Mild AR , Moderately severe to severe MR, Mod TR, Severe pulmonary htn.     Assessment:  · Dyspnea  · Volume overload  · Bilateral  pleural effusion R>L        Plan:  · Keep POX 90-95%, o2 as needed currently on room air  · Cardiology consulted, on IV lasix for now , pro BNP 4,547, await input -plan to treat medical for now,   · cxr with right effusion/atelectasis, no role for thoracentesis at this time   ·  procalcitonin 0.12 , r/o pneumonia doubt as WBC normal, afebrile , no need for abx at this time   · IS for pulmonary hygiene   · Most likely will needs outpatient PFT and PSG   · Will need to [facility] with [facility] as [facility]          Electronically signed by SHONNA Carlos on 12/9/2020 at 10:17 AM      Seen and examined, agree with above. Mild subjective congestion, dyspnea and wheeze, should respond to continued diuresis. No plans to tap small right effusion.   For OP PSG and PFT.

## 2020-12-09 NOTE — PROGRESS NOTES
0.4 12/07/2020    MONOSABS 0.62 12/07/2020    LYMPHSABS 1.13 12/07/2020    EOSABS 0.04 12/07/2020    BASOSABS 0.03 12/07/2020     CMP:    Lab Results   Component Value Date     12/09/2020    K 4.2 12/09/2020    K 4.1 12/07/2020     12/09/2020    CO2 21 12/09/2020    BUN 22 12/09/2020    CREATININE 1.3 12/09/2020    GFRAA >60 12/09/2020    LABGLOM 54 12/09/2020    GLUCOSE 123 12/09/2020    LABALBU 4.1 07/23/2019    CALCIUM 9.5 12/09/2020    BILITOT 0.8 07/23/2019    ALKPHOS 44 07/23/2019    AST 15 07/23/2019    ALT 24 07/23/2019       No orders to display       Assessment:     CHF-severe systolic dysfunction, severe pulm htn, mod severe to severe MR, mod TR on echo 12/3/20 @ heart center. Hx CAD with atherectomy 1996  Hypertension  Chronic cough- ACEI changed to ARB X1 month, no improvement. Did not tolerate PPI.       Plan:     Lasix restarted at 20 mg IV q 12 hrs. Ambulate. Follow labs. D/C PPI per pt request.  Home perhaps 24-48 hrs.         Electronically signed by Evaristo Linares MD on 12/9/2020 at 7:50 AM

## 2020-12-09 NOTE — CARE COORDINATION
Social Work/Discharge Planning:  Met with patient and completed initial assessment. Explained Social Work role and discussed transition of care/discharge planning. COVID negative 12/7. Patient lives with his wife in a one story house. PTA patient independent with no adaptive device. Patient states he has no durable medical equipment. He has no history with hhc or snf. Patient PCP is Dr. Lela Orona and pharmacy is [facility] in [place]. Patient states he also goes to the [address] [place] [address] for his medications and his PCP at the [address] [place] [address] is Dr. Anneliese Fu. He plans to return home and denies any home care needs at this time. Will continue to follow and assist with discharge planning.    Electronically signed by СЕРГЕЙ Sinclair on 12/9/2020 at 11:24 AM

## 2020-12-09 NOTE — PROGRESS NOTES
Meds:.acetaminophen **OR** acetaminophen, polyethylene glycol, sodium chloride flush    Allergies: Beta adrenergic blockers and Statins      Labs:   Recent Labs     12/07/20  1320 12/08/20  0321   WBC 7.6 7.9   HGB 14.5 13.1   HCT 43.6 39.7   MCV 90.8 90.4    124*       Labs:   Recent Labs     12/07/20  1320 12/08/20  0321 12/09/20  0525    134 134   K 4.1 3.6 4.2   CL 97* 104 102   CO2 21* 20* 21*   BUN 22 18 22   CREATININE 1.2 1.1 1.3*       Labs:   Recent Labs     12/07/20  1320 12/07/20  1830   TROPONINI <0.01 <0.01       Labs: No results for input(s): BNP in the last 72 hours. Labs:   Recent Labs     12/08/20  0321   CHOL 71   HDL 35   TRIG 58       Labs: No results for input(s): PROT, INR in the last 72 hours. Review of systems: No reported significant weight gain or weight loss. no dysuria or frequency, no dizziness, falls or trauma, no change in bowel or bladder habits, no hematochezia, hemoptysis or hematuria. No fevers, chills, nausea or vomiting reported. No significant wheezing or sputum production. No headache or visual changes. The remainder of the 10 review of systems otherwise negative. Exam      General: Patient comfortable in no distress and currently denies any chest pain. HEENT: Face symmetrical and no apparent cranial nerve deficit. Neck: No jugular venous distention, carotid bruit or thyromegaly. Lungs: Clear bilaterally without rales, wheezes or dullness. Cardiac: Regular rate and rhythm, no S3, S4, no rub or gallop. Abdomen: No rebound or guarding, no hepatosplenomegaly. Extremities: Without significant clubbing , cyanosis, or edema. Neuro:  No focal motor or sensory deficit apparent. Skin: No petechiae, no significant bruising. Assessment: See plan below        Plan: #1 acute systolic CHF and will reduce IV Lasix to 20 mg twice a day instead of 40 mg twice a day.   Continue low-dose Entresto and carvedilol started this admission and his ACE inhibitor has been stopped. CHF education reviewed. Input and output -1.5 L overnight. Prior to admission was on furosemide 40 mg twice a day by mouth. Discussed with him possible heart catheterization as he already has a follow-up appointment in my office in about 10 days time and if he is feeling well and can lay flat without significant orthopnea would schedule heart catheterization at that time for further delineation of coronary anatomy. #2 nonsustained VT 8 beat run last evening and try to keep potassium 4 or greater and today potassium 4.2, creatinine 1.3 BUN 22. No reported awareness of palpitations or syncope or near syncope. #3 chronic CAD myocardial infarction 1994 and continue medical management. Continue aspirin 81 mg daily, fish oil, beta-blocker carvedilol. #4 valve regurgitation mitral valve and tricuspid valve and optimizing blood pressure. #5 severe pulmonary hypertension. Could be from LV systolic dysfunction, mitral valve regurgitation. Would like him to ambulate on telemetry today and have cautioned him about any quick or sudden position changes especially in the hospital with active diuresis as he may become orthostatic.       Electronically signed by Tilmon Moritz, MD on 12/9/2020 at 1:06 PM

## 2020-12-09 NOTE — PLAN OF CARE
Problem: Safety:  Goal: Free from accidental physical injury  Description: Free from accidental physical injury  Outcome: Met This Shift     Problem: Daily Care:  Goal: Daily care needs are met  Description: Daily care needs are met  Outcome: Met This Shift     Problem: OXYGENATION/RESPIRATORY FUNCTION  Goal: Patient will maintain patent airway  Outcome: Met This Shift

## 2020-12-10 LAB
ANION GAP SERPL CALCULATED.3IONS-SCNC: 11 MMOL/L (ref 7–16)
BUN BLDV-MCNC: 23 MG/DL (ref 8–23)
CALCIUM SERPL-MCNC: 9.3 MG/DL (ref 8.6–10.2)
CHLORIDE BLD-SCNC: 99 MMOL/L (ref 98–107)
CO2: 21 MMOL/L (ref 22–29)
CREAT SERPL-MCNC: 1.1 MG/DL (ref 0.7–1.2)
GFR AFRICAN AMERICAN: >60
GFR NON-AFRICAN AMERICAN: >60 ML/MIN/1.73
GLUCOSE BLD-MCNC: 120 MG/DL (ref 74–99)
MAGNESIUM: 2.1 MG/DL (ref 1.6–2.6)
POTASSIUM SERPL-SCNC: 4.1 MMOL/L (ref 3.5–5)
PRO-BNP: 2758 PG/ML (ref 0–125)
SODIUM BLD-SCNC: 131 MMOL/L (ref 132–146)

## 2020-12-10 PROCEDURE — 83735 ASSAY OF MAGNESIUM: CPT

## 2020-12-10 PROCEDURE — 36415 COLL VENOUS BLD VENIPUNCTURE: CPT

## 2020-12-10 PROCEDURE — 83880 ASSAY OF NATRIURETIC PEPTIDE: CPT

## 2020-12-10 PROCEDURE — 6370000000 HC RX 637 (ALT 250 FOR IP): Performed by: INTERNAL MEDICINE

## 2020-12-10 PROCEDURE — 80048 BASIC METABOLIC PNL TOTAL CA: CPT

## 2020-12-10 PROCEDURE — 2500000003 HC RX 250 WO HCPCS: Performed by: INTERNAL MEDICINE

## 2020-12-10 PROCEDURE — 2060000000 HC ICU INTERMEDIATE R&B

## 2020-12-10 PROCEDURE — 2580000003 HC RX 258: Performed by: INTERNAL MEDICINE

## 2020-12-10 PROCEDURE — 6360000002 HC RX W HCPCS: Performed by: INTERNAL MEDICINE

## 2020-12-10 RX ORDER — BUMETANIDE 0.25 MG/ML
1 INJECTION, SOLUTION INTRAMUSCULAR; INTRAVENOUS DAILY
Status: DISCONTINUED | OUTPATIENT
Start: 2020-12-10 | End: 2020-12-11

## 2020-12-10 RX ORDER — BUMETANIDE 1 MG/1
1 TABLET ORAL DAILY
Status: DISCONTINUED | OUTPATIENT
Start: 2020-12-10 | End: 2020-12-10 | Stop reason: SDUPTHER

## 2020-12-10 RX ORDER — BENZONATATE 100 MG/1
200 CAPSULE ORAL 3 TIMES DAILY PRN
Status: DISCONTINUED | OUTPATIENT
Start: 2020-12-10 | End: 2020-12-12 | Stop reason: HOSPADM

## 2020-12-10 RX ADMIN — Medication 10 ML: at 21:15

## 2020-12-10 RX ADMIN — Medication 1000 UNITS: at 10:07

## 2020-12-10 RX ADMIN — ASPIRIN 81 MG CHEWABLE TABLET 81 MG: 81 TABLET CHEWABLE at 10:07

## 2020-12-10 RX ADMIN — LEVOTHYROXINE SODIUM 50 MCG: 50 TABLET ORAL at 06:09

## 2020-12-10 RX ADMIN — BUMETANIDE 1 MG: 0.25 INJECTION, SOLUTION INTRAMUSCULAR; INTRAVENOUS at 10:07

## 2020-12-10 RX ADMIN — SACUBITRIL AND VALSARTAN 1 TABLET: 24; 26 TABLET, FILM COATED ORAL at 21:15

## 2020-12-10 RX ADMIN — SACUBITRIL AND VALSARTAN 1 TABLET: 24; 26 TABLET, FILM COATED ORAL at 10:07

## 2020-12-10 RX ADMIN — BENZONATATE 200 MG: 100 CAPSULE ORAL at 13:15

## 2020-12-10 RX ADMIN — Medication 10 ML: at 10:07

## 2020-12-10 RX ADMIN — CARVEDILOL 3.12 MG: 3.12 TABLET, FILM COATED ORAL at 07:48

## 2020-12-10 RX ADMIN — ENOXAPARIN SODIUM 40 MG: 40 INJECTION SUBCUTANEOUS at 10:07

## 2020-12-10 ASSESSMENT — PAIN SCALES - GENERAL
PAINLEVEL_OUTOF10: 0
PAINLEVEL_OUTOF10: 0

## 2020-12-10 NOTE — PROGRESS NOTES
Admit Date: 12/7/2020    Subjective:     Patient seen this am. Problem with Epic (ticket opened), left me unable to write orders, prog note at that time. Continues to cough, otherwise, feeling ok. Beginning to ambulate a bit more. Was in gray yesterday. Scheduled Meds:   bumetanide  1 mg Intravenous Daily    Vitamin D  1,000 Units Oral Daily    enoxaparin  40 mg Subcutaneous Daily    sodium chloride flush  10 mL Intravenous 2 times per day    levothyroxine  50 mcg Oral Daily    aspirin  81 mg Oral Daily    sacubitril-valsartan  1 tablet Oral BID    carvedilol  3.125 mg Oral BID WC     Continuous Infusions:  PRN Meds:benzonatate, acetaminophen **OR** acetaminophen, polyethylene glycol, sodium chloride flush      Objective:     I/O last 3 completed shifts: In: 480 [P.O.:480]  Out: 950 [Urine:950]  I/O this shift:  In: -   Out: 125 [Urine:125]  BP 88/62   Pulse 87   Temp 97.8 °F (36.6 °C) (Axillary)   Resp 17   Ht 5' 8\" (1.727 m)   Wt 194 lb 0.1 oz (88 kg)   SpO2 96%   BMI 29.50 kg/m²     Neck: no adenopathy, no carotid bruit, no JVD, supple, symmetrical, trachea midline and thyroid not enlarged, no tenderness/mass/nodules  Lungs: Clear to A and P. Few crackles R base.   Heart: regular rate and rhythm, S1, S2 normal, no murmur, regular rate and rhythm ,no precordial heave  Abdomen:soft, non-tender; non-distended normal bowel sounds no masses, no organomegaly  Extremities:Pedal edema Trace  Homans sign is negative, no sign of DVT       Data Review    CBC with Differential:    Lab Results   Component Value Date    WBC 7.9 12/08/2020    RBC 4.39 12/08/2020    HGB 13.1 12/08/2020    HCT 39.7 12/08/2020     12/08/2020    MCV 90.4 12/08/2020    MCH 29.8 12/08/2020    MCHC 33.0 12/08/2020    RDW 14.8 12/08/2020    LYMPHOPCT 14.9 12/07/2020    MONOPCT 8.2 12/07/2020    EOSPCT 2.0 07/23/2019    BASOPCT 0.4 12/07/2020    MONOSABS 0.62 12/07/2020    LYMPHSABS 1.13 12/07/2020    EOSABS 0.04 12/07/2020 BASOSABS 0.03 12/07/2020     CMP:    Lab Results   Component Value Date     12/10/2020    K 4.1 12/10/2020    K 4.1 12/07/2020    CL 99 12/10/2020    CO2 21 12/10/2020    BUN 23 12/10/2020    CREATININE 1.1 12/10/2020    GFRAA >60 12/10/2020    LABGLOM >60 12/10/2020    GLUCOSE 120 12/10/2020    LABALBU 4.1 07/23/2019    CALCIUM 9.3 12/10/2020    BILITOT 0.8 07/23/2019    ALKPHOS 44 07/23/2019    AST 15 07/23/2019    ALT 24 07/23/2019       No orders to display       Assessment:     -CHF-severe systolic dysfunction, severe pulm htn, mod severe to severe MR, mod TR on echo 12/3/20 [facility].  -Hx CAD with atherectomy 1996  -Hypertension  -Chronic cough- ACEI changed to ARB X1 month, no improvement. Did not tolerate PPI.       Plan:     Now on IV bumex  Ambulate  IF BP stable, can d/c tomorrow. Stephane king for cough.       Electronically signed by Bonny Tubbs MD on 12/10/2020 at 6:00 PM

## 2020-12-10 NOTE — PROGRESS NOTES
Pulmonary Progress Note    Admit Date: 2020                            PCP: Radha Camacho MD  Active Problems:    CHF (congestive heart failure), NYHA class I, acute on chronic[facility]  Resolved Problems:    * No resolved hospital problems. *      Subjective:  Up walking in room on room air. Feels some mild congestion and dry intermittent cough. Medications:       bumetanide  1 mg Intravenous Daily    Vitamin D  1,000 Units Oral Daily    enoxaparin  40 mg Subcutaneous Daily    sodium chloride flush  10 mL Intravenous 2 times per day    levothyroxine  50 mcg Oral Daily    aspirin  81 mg Oral Daily    sacubitril-valsartan  1 tablet Oral BID    carvedilol  3.125 mg Oral BID WC       Vitals:  VITALS:  BP 87/65   Pulse 87   Temp 97.8 °F (36.6 °C) (Axillary)   Resp 17   Ht 5' 8\" (1.727 m)   Wt 194 lb 0.1 oz (88 kg)   SpO2 96%   BMI 29.50 kg/m²   24HR INTAKE/OUTPUT:      Intake/Output Summary (Last 24 hours) at 12/10/2020 1602  Last data filed at 12/10/2020 1322  Gross per 24 hour   Intake 300 ml   Output 950 ml   Net -650 ml     CURRENT PULSE OXIMETRY:  SpO2: 96 %  24HR PULSE OXIMETRY RANGE:  SpO2  Av.3 %  Min: 96 %  Max: 98 %  CVP:    VENT SETTINGS:   Vent Information  SpO2: 96 %  Additional Respiratory  Assessments  Pulse: 87  Resp: 17  SpO2: 96 %      EXAM:  General: Alert, in NAD  Neck: Supple, Trachea midline. Resp: No accessory muscle use. Non-labored. Lungs clear all fields. CV: Regular rate. Regular rhythm. No murmur No edema. ABD: Non-tender. Non-distended. Soft, round . Normal bowel sounds. Skin: Warm and dry. Neuro: Awake. Follows commands. O x 3, SIMMONS  Psych:  calm and interactive     I/O: I/O last 3 completed shifts: In: 480 [P.O.:480]  Out: 950 [Urine:950]  No intake/output data recorded.      Results:  CBC:   Recent Labs     20  0321   WBC 7.9   HGB 13.1   HCT 39.7   MCV 90.4   *     BMP:   Recent Labs     20  0321 20  0525 12/10/20  1200    134 131*   K 3.6 4.2 4.1    102 99   CO2 20* 21* 21*   BUN 18 22 23   CREATININE 1.1 1.3* 1.1     LFT: No results for input(s): ALKPHOS, ALT, AST, PROT, BILITOT, BILIDIR, LABALBU in the last 72 hours. PT/INR: No results for input(s): PROTIME, INR in the last 72 hours. Procalcitonin:   Recent Labs     12/09/20  0525   PROCAL 0.12*     Cultures:  Results for Saintclair Kung (MRN [id number]) as of 12/8/2020 09:27    Ref.  Range 12/7/2020 13:58   Influenza A by PCR Latest Ref Range: Not Detected  Not Detected   Influenza B by PCR Latest Ref Range: Not Detected  Not Detected   Adenovirus by PCR Latest Ref Range: Not Detected  Not Detected   Coronavirus 229E by PCR Latest Ref Range: Not Detected  Not Detected   Coronavirus HKU1 by PCR Latest Ref Range: Not Detected  Not Detected   Coronavirus NL63 by PCR Latest Ref Range: Not Detected  Not Detected   Coronavirus OC43 by PCR Latest Ref Range: Not Detected  Not Detected   Human Metapneumovirus by PCR Latest Ref Range: Not Detected  Not Detected   Human Rhinovirus/Enterovirus by PCR Latest Ref Range: Not Detected  Not Detected   Parainfluenza Virus 1 by PCR Latest Ref Range: Not Detected  Not Detected   Parainfluenza Virus 2 by PCR Latest Ref Range: Not Detected  Not Detected   Parainfluenza Virus 3 by PCR Latest Ref Range: Not Detected  Not Detected   Parainfluenza Virus 4 by PCR Latest Ref Range: Not Detected  Not Detected   Respiratory Syncytial Virus by PCR Latest Ref Range: Not Detected  Not Detected   Bordetella parapertussis by PCR Latest Ref Range: Not Detected  Not Detected   Chlamydophilia pneumoniae by PCR Latest Ref Range: Not Detected  Not Detected   Mycoplasma pneumoniae by PCR Latest Ref Range: Not Detected  Not Detected   SARS-CoV-2, PCR Latest Ref Range: Not Detected  Not Detected   Bordetella pertussis by PCR Latest Ref Range: Not Detected  Not Detected            ABG:   No results for input(s): PH, PO2, PCO2, HCO3, BE, O2SAT in the last 72 hours. Films:  Xr Chest Portable    Result Date: 12/7/2020  EXAMINATION: ONE XRAY VIEW OF THE CHEST 12/7/2020 2:33 pm COMPARISON: None. HISTORY: ORDERING SYSTEM PROVIDED HISTORY: Shortness of breath TECHNOLOGIST PROVIDED HISTORY: Reason for exam:->Shortness of breath FINDINGS: The heart is enlarged. Patchy bilateral airspace disease is noted. There is a more consolidative infiltrate seen within the right lower lobe. There is no left pleural effusion. 1. Cardiomegaly with findings of patchy bilateral airspace disease which could represent CHF or pneumonia 2. More consolidative infiltrate seen within the right lung base favored to represent pneumonia.              Echo not available but reported from Dr. Stoney Castano note as :  severe LV dysfunction: 10-15% EF, Indeterminate DD, Mild AR , Moderately severe to severe MR, Mod TR, Severe pulmonary htn.     Assessment:  · Dyspnea  · Volume overload  · Bilateral  pleural effusion R>L        Plan:  · Keep POX 90-95%, on room air  · Cardiology following, on IV bumex and Entresto.    · CXR with small right effusion/atelectasis, no plans for thoracentesis at this time   · IS for pulmonary hygiene   · Will plan outpatient PFT and PSG   · To follow up in 1-2 weeks with [facility]      Electronically signed by SHONNA Glynn CNP on 12/10/2020 at 4:01 PM

## 2020-12-10 NOTE — CARE COORDINATION
CASE MANAGEMENT. Payton Rubin Chart reviewed. Met with patient at the bedside. Mr Audrey Gregory was started on entresto this admission. I provided him with a free 30 day discount card. Per conversation with Dr Huma Castaneda,  his office will follow for prior auth of entresto if needed. I instructed Mr Audrey Gregory to follow with up with the office. He is agreeable. He is tolerating room air. Hypotensive at times. Med adjustments per cardio. Anticipating discharge home tomorrow. Mr Audrey Gregory denies any needs. Will follow.

## 2020-12-10 NOTE — PROGRESS NOTES
[facility] CARDIOLOGY PROGRESS NOTE  [facility]        Subjective: Acute systolic CHF LVEF 55%, known CAD prior atherectomy 1994 with [facility]. Reports he still has a cough that is nonproductive. Borderline blood pressure overnight and some other medications were withheld. Did not diurese much but did not receive IV Lasix last evening. Objective: Labs chart medications and telemetry are reviewed. Patient Vitals for the past 24 hrs:   BP Temp Temp src Pulse Resp SpO2 Weight   12/10/20 1000 97/74 -- -- 88 -- -- --   12/10/20 0742 101/72 97.5 °F (36.4 °C) Oral 78 20 96 % --   12/10/20 0022 -- -- -- -- -- -- 194 lb 0.1 oz (88 kg)   12/09/20 2345 (!) 86/59 98 °F (36.7 °C) Oral 83 18 98 % --   12/09/20 1945 94/66 98.1 °F (36.7 °C) Oral 97 18 98 % --   12/09/20 1745 (!) 92/54 -- -- -- -- -- --   12/09/20 1715 (!) 90/54 -- -- -- -- -- --   12/09/20 1545 -- -- -- -- -- 92 % --   12/09/20 1509 106/60 97.4 °F (36.3 °C) Oral 99 17 -- --         Intake/Output Summary (Last 24 hours) at 12/10/2020 1049  Last data filed at 12/10/2020 1011  Gross per 24 hour   Intake 360 ml   Output 1450 ml   Net -1090 ml       Wt Readings from Last 3 Encounters:   12/10/20 194 lb 0.1 oz (88 kg)   12/07/20 185 lb (83.9 kg)   10/21/20 188 lb (85.3 kg)       Telemetry: I personally reviewed and shows normal sinus rhythm heart rate of 90.     Current meds: Scheduled Meds:   bumetanide  1 mg Intravenous Daily    Vitamin D  1,000 Units Oral Daily    enoxaparin  40 mg Subcutaneous Daily    sodium chloride flush  10 mL Intravenous 2 times per day    levothyroxine  50 mcg Oral Daily    aspirin  81 mg Oral Daily    sacubitril-valsartan  1 tablet Oral BID    carvedilol  3.125 mg Oral BID WC     Continuous Infusions:  PRN Meds:.acetaminophen **OR** acetaminophen, polyethylene glycol, sodium chloride flush    Allergies: Beta adrenergic blockers and Statins      Labs:   Recent Labs     12/07/20  1320 12/08/20  0321   WBC 7.6 7.9 HGB 14.5 13.1   HCT 43.6 39.7   MCV 90.8 90.4    124*       Labs:   Recent Labs     12/07/20  1320 12/08/20  0321 12/09/20  0525    134 134   K 4.1 3.6 4.2   CL 97* 104 102   CO2 21* 20* 21*   BUN 22 18 22   CREATININE 1.2 1.1 1.3*       Labs:   Recent Labs     12/07/20  1320 12/07/20  1830   TROPONINI <0.01 <0.01       Labs: No results for input(s): BNP in the last 72 hours. Labs:   Recent Labs     12/08/20  0321   CHOL 71   HDL 35   TRIG 58       Labs: No results for input(s): PROT, INR in the last 72 hours. Review of systems: No reported significant weight gain or weight loss. no dysuria or frequency, no dizziness, falls or trauma, no change in bowel or bladder habits, no hematochezia, hemoptysis or hematuria. No fevers, chills, nausea or vomiting reported. No significant wheezing or sputum production. No headache or visual changes. The remainder of the 10 review of systems otherwise negative. Exam    Ambulating in room today without any reported chest pain or shortness of breath. General: Patient comfortable in no distress and currently denies any chest pain. HEENT: Face symmetrical and no apparent cranial nerve deficit. Neck: No jugular venous distention, carotid bruit or thyromegaly. Lungs: Clear bilaterally without rales, wheezes or dullness. Cardiac: Regular rate and rhythm, no S3, S4, no rub or gallop. Abdomen: No rebound or guarding, no hepatosplenomegaly. Extremities: Without significant clubbing , cyanosis, or edema. Neuro:  No focal motor or sensory deficit apparent. Skin: No petechiae, no significant bruising. Assessment: See plan below        Plan: #1 acute systolic CHF. Did not have much diuresis outpatient on furosemide 40 mg twice a day by mouth and here in the hospital limited diuresis so we will switch to Bumex 1 mg p.o. twice a day. Entresto 24/26 mg twice a day.   With LVEF 15% and moderate to severe valve regurgitation will expect and want to see systolic blood pressure in the  range. We will watch in the hospital today as he will receive Entresto and carvedilol and follow-up blood pressure response. CHF education again reviewed. #2 nonsustained VT and no reported syncope or near syncope. Denies any syncope or near syncope and now restarted on carvedilol 3.125 mg twice a day. Blood pressure has limited aggressive doses of CHF guideline directed medical therapy. #3 chronic CAD with myocardial infarction 1994 and atherectomy at that time. Previously he has been intolerant to statins. #4 echocardiogram December 3, 2020 with 3+ MR/2+ TR. Optimizing blood pressure. #5 severe pulmonary hypertension RVSP 75 mmHg by echocardiogram December 3, 2020. Likely plan discharge to home tomorrow.       Electronically signed by Terence Morgan MD on 12/10/2020 at 10:49 AM

## 2020-12-10 NOTE — DISCHARGE INSTR - DIET
 Good nutrition is important when healing from an illness, injury, or surgery. Follow any nutrition recommendations given to you during your hospital stay.  If you were given an oral nutrition supplement while in the hospital, continue to take this supplement at home. You can take it with meals, in-between meals, and/or before bedtime. These supplements can be purchased at most local grocery stores, pharmacies, and chain super-stores.  If you have any questions about your diet or nutrition, call your dietitian: Xavier Miller RD, LD, [facility] at [phone]      Nutrition Therapy for Congestive Heart Failure: This nutrition therapy will help you feel better and support your heart. This plan focuses on:  Limiting sodium in your diet. Salt (sodium) makes your body hold water. When your body holds too much water, you can feel shortness of breath and swelling. You can prevent these symptoms by eating less salt. Limiting fluid in your diet. For some patients, drinking too much fluid can make heart failure worse. It can cause symptoms such as shortness of breath and swelling. Limiting fluids can help relieve some of your symptoms. Managing your weight. Your registered dietitian nutritionist (RDN) can help you choose a healthy weight for your body type. You can achieve these goals by:  Reading food labels to keep track of how much sodium is in the foods you eat. Limiting foods that are high in sodium. Checking your weight to make sure youre not retaining too much fluid. Reading the Food Label: How Much Sodium Is Too Much? The nutrition plan for heart failure usually limits the sodium you get from food and drinks to 2,000 milligrams per day. Salt is the main source of sodium. Read the nutrition label to find out how much sodium is in 1 serving of a food. Select foods with 140 milligrams of sodium or less per serving.    Foods with more than 300 milligrams of sodium per serving may not fit into a reduced-sodium meal plan. Check serving sizes. If you eat more than 1 serving, you will get more sodium than the amount listed. Cutting Back on Sodium  Avoid processed foods. Eat more fresh foods. Fresh and frozen fruits and vegetables without added juices or sauces are naturally low in sodium. Fresh meats are lower in sodium than processed meats, such as carrillo, sausage, and hot dogs. Read the nutrition label or ask your  to help you find a fresh meat that is low in sodium. Eat less salt, at the table and when cooking. Just 1 teaspoon of table salt has 2,300 milligrams of sodium. Leave the salt out of recipes for pasta, casseroles, and soups. Ask your RDN how to cook your favorite recipes without sodium. Be a smart . Look for food packages that say salt-free or sodium-free.  These items contain less than 5 milligrams of sodium per serving. Very-low-sodium products contain less than 35 milligrams of sodium per serving. Low-sodium products contain less than 140 milligrams of sodium per serving. Unsalted or no added salt products may still be high in sodium. Check the nutrition label. Add flavors to your food without adding sodium. Try lemon juice, lime juice, fruit juice, or vinegar. Dry or fresh herbs add flavor. Try basil, bay leaf, dill, rosemary, parsley, irish, dry mustard, nutmeg, thyme, and paprika. Pepper, red pepper flakes, and cayenne pepper can add spice to your meals without adding sodium. Hot sauce contains sodium, but if you use just a drop or two, it will not add up to much. Buy a sodium-free seasoning blend or make your own at home. Use caution when you eat outside your home. Restaurant foods can be very high in sodium. Ask for nutrition information. Many restaurants provide nutrition facts on their menus or websites. Let your  know that you want your food to be cooked without salt.  Ask for your salad dressing and sauces to come on the side.     Fluid Restriction  Your doctor may ask you to follow a fluid restriction in addition to taking diuretics (water pills). Ask your doctor how much fluid you can have. Foods that are liquid at room temperature are considered a fluid, such as popsicles, soup, ice cream, and Jell-O. Here are some common conversions that will help you measure your fluid intake every day:    1,000 milliliters = 1 liter or 4 cups 1 fluid ounce = 30 milliliters   1 cup = 240 milliliters 2,000 milliliters = 2 liters or 8 cups   1,500 milliliters = 1½ liters or 6 cups      Weight Monitoring  Weigh yourself each day. Sudden weight gain is a sign that fluid is building up in your body. Follow these guidelines:  Weigh yourself every morning. If you gain 3 or more pounds in 1-2 days or 5 or more pounds within 1 week, call your doctor. Your doctor may adjust your medicine to get rid of the extra fluid. Talk with your doctor or RDN about what a healthy weight is for you. Talk with your doctor to find out what type of physical activity is best for you.     Foods Recommended  Food Group Recommended Foods   Grains Bread with less than 80 milligrams sodium per slice (yeast breads usually have less sodium than those made with baking soda)  Homemade bread made with reduced-sodium baking soda  Many cold cereals, especially shredded wheat and puffed rice  Oats, grits, or cream of wheat  Dry pastas, noodles, quinoa, and rice     Vegetables Fresh and frozen vegetables without added sauces, salt, or sodium  Homemade soups (salt free or low sodium)  Low-sodium or sodium-free canned vegetables and soups     Fruits Fresh and canned fruits  Dried fruits, such as raisins, cranberries, and prunes     Dairy (Milk and Milk Products) Milk or milk powder  Rice milk and soy milk  Yogurt, including Greek yogurt  Small amounts of natural, block cheese or reduced-sodium cheese (Swiss, ricotta, and fresh mozzarella are lower in sodium than others)  Regular or soft cream cheese and low-sodium cottage cheese     Protein Foods (Meat, Poultry, Fish, Beans) Fresh meats and fish  Citizen of the [place] ([facility]) carrillo (except if packaged in a sodium solution)  Canned or packed tuna (no more than 4 ounces at 1 serving)  Dried beans and peas; edamame (fresh soybeans)  Eggs or egg beaters (if less than 200 mg per serving)  Unsalted nuts or peanut butter     Desserts and Snacks Fresh fruit or applesauce  Dima food cake  Granola bars  Unsalted pretzels, popcorn, or nuts  Pudding or Jell-O with Cool-Whip topping  Homemade rice-crispy treats  Vanilla wafers  Frozen fruit bars     Fats Tub or liquid margarine  Unsaturated fat oils (canola, olive, corn, sunflower, safflower, peanut)     Condiments Fresh or dried herbs; low-sodium ketchup; vinegar; lemon or lime juice; pepper; salt-free seasoning mixes and marinades (Mrs. Maged Gusman or Sally salt-free blend); simple salad dressings (vinegar and oil); salt-free sauces     Foods Not Recommended  Food Group Foods Not Recommended   Grains Breads or crackers topped with salt  Cereals (hot/cold) with more than 300 milligrams sodium per serving  Biscuits, cornbread, and other quick breads prepared with baking soda  Prepackaged bread crumbs  Self-rising flours     Vegetables Canned vegetables (unless they are salt free or low sodium)  Frozen vegetables with seasoning and sauces  Sauerkraut and pickled vegetables  Canned or dried soups (unless they are salt free or low sodium)  [place] fries and onion rings     Fruits Dried fruits preserved with sodium-containing additives     Dairy (Milk and Milk Products) Buttermilk  Processed cheeses such as Cheese Whiz, Velveeta, and [employer] (unless a low-sodium variety)  Feta cheese; shredded cheese (has more sodium than block cheese); singles slices and string cheese     Protein Foods (Meat, Poultry, Fish, Beans) Cured meats: carrillo, ham, sausage, pepperoni, and hot dogs  Canned meats: chili, [place] sausage, sardines, and Spam  Smoked fish and meats  Frozen meals that have more than 600 milligrams sodium     Fats Salted butter or margarine     Condiments Salt, sea salt, kosher salt, onion salt, and garlic salt  Seasoning mixes containing salt (Lemon Pepper or Bouillon cubes)  Catsup or ketchup, BBQ sauce, Worcestershire and soy sauce  Salsa, pickles, olives, relish  Salad dressings: ranch, blue cheese, [place], and [place]      Alcohol Check with your doctor.      Heart Failure Sample 1-Day Menu   Breakfast 1 cup regular oatmeal made with water or milk   1 cup reduced-fat (2%) milk   1 medium banana   1 slice whole wheat bread   1 tablespoon salt-free peanut butter   Morning Snack 1/2 cup dried cranberries   Lunch 3 ounces grilled chicken breast   1 cup salad greens   Olive oil and vinegar dressing (for greens)   5 unsalted or low-sodium crackers   Fruit plate with 1/4 cup strawberries   1/2 sliced orange (for fruit plate)   1 peach half (for fruit plate)   Afternoon Snack 1 ounce low-sodium turkey   1 piece whole wheat bread   Evening Meal 3 ounces herb-baked fish   1 baked potato   2 teaspoons soft margarine (trans fat-free) (for potato)   Sliced tomatoes   1/2 cup steamed spinach drizzled with lemon juice   3-inch square of leila food cake   Fresh strawberries (2) (for cake)   Evening Snack 2 tablespoons salt-free peanut butter   5 low-sodium crackers

## 2020-12-10 NOTE — CONSULTS
12/10/2020  2:21 PM      Nutrition Education      · Education materials on: CHF Nutrition Therapy (Low Na diet)  · Written educational materials provided. · Contact name and number provided. Summary: Consult for Diet Education on CHF Nutrition Therapy. To assure pt has diet information to use after discharge at home and available family (Covid restrict/no visitor), Attached written diet copy and RD contact information in Discharge Instructions. Pt is currently eating >50% at meals on Low Na diet.       Electronically signed by Cachorro Harden RD, CNSC, LD on 12/10/20 at 2:21 PM EST    Contact: [phone]

## 2020-12-10 NOTE — PLAN OF CARE
Problem: Safety:  Goal: Free from accidental physical injury  Description: Free from accidental physical injury  12/10/2020 1322 by Stacy Munguia RN  Outcome: Met This Shift  12/10/2020 0027 by Corine Tineo RN  Outcome: Met This Shift  Goal: Free from intentional harm  Description: Free from intentional harm  Outcome: Met This Shift     Problem: Daily Care:  Goal: Daily care needs are met  Description: Daily care needs are met  12/10/2020 1322 by Stacy Munguia RN  Outcome: Met This Shift  12/10/2020 0027 by Corine Tineo RN  Outcome: Met This Shift     Problem: Discharge Planning:  Goal: Patients continuum of care needs are met  Description: Patients continuum of care needs are met  Outcome: Met This Shift     Problem: OXYGENATION/RESPIRATORY FUNCTION  Goal: Patient will maintain patent airway  Outcome: Met This Shift  Goal: Patient will achieve/maintain normal respiratory rate/effort  Description: Respiratory rate and effort will be within normal limits for the patient  12/10/2020 1322 by Stacy Munguia RN  Outcome: Met This Shift  12/10/2020 0027 by Corine Tineo RN  Outcome: Met This Shift     Problem: HEMODYNAMIC STATUS  Goal: Patient has stable vital signs and fluid balance  Outcome: Met This Shift     Problem: FLUID AND ELECTROLYTE IMBALANCE  Goal: Fluid and electrolyte balance are achieved/maintained  Outcome: Met This Shift     Problem: ACTIVITY INTOLERANCE/IMPAIRED MOBILITY  Goal: Mobility/activity is maintained at optimum level for patient  Outcome: Met This Shift     Problem: Falls - Risk of:  Goal: Will remain free from falls  Description: Will remain free from falls  Outcome: Met This Shift  Goal: Absence of physical injury  Description: Absence of physical injury  Outcome: Met This Shift

## 2020-12-11 ENCOUNTER — APPOINTMENT (OUTPATIENT)
Dept: GENERAL RADIOLOGY | Age: 70
DRG: 292 | End: 2020-12-11
Attending: INTERNAL MEDICINE
Payer: MEDICARE

## 2020-12-11 LAB
ANION GAP SERPL CALCULATED.3IONS-SCNC: 10 MMOL/L (ref 7–16)
BUN BLDV-MCNC: 24 MG/DL (ref 8–23)
CALCIUM SERPL-MCNC: 9.4 MG/DL (ref 8.6–10.2)
CHLORIDE BLD-SCNC: 103 MMOL/L (ref 98–107)
CO2: 21 MMOL/L (ref 22–29)
CREAT SERPL-MCNC: 1.2 MG/DL (ref 0.7–1.2)
GFR AFRICAN AMERICAN: >60
GFR NON-AFRICAN AMERICAN: 60 ML/MIN/1.73
GLUCOSE BLD-MCNC: 106 MG/DL (ref 74–99)
MAGNESIUM: 2.1 MG/DL (ref 1.6–2.6)
POTASSIUM SERPL-SCNC: 4 MMOL/L (ref 3.5–5)
SODIUM BLD-SCNC: 134 MMOL/L (ref 132–146)

## 2020-12-11 PROCEDURE — 36415 COLL VENOUS BLD VENIPUNCTURE: CPT

## 2020-12-11 PROCEDURE — 2500000003 HC RX 250 WO HCPCS: Performed by: INTERNAL MEDICINE

## 2020-12-11 PROCEDURE — 6370000000 HC RX 637 (ALT 250 FOR IP): Performed by: INTERNAL MEDICINE

## 2020-12-11 PROCEDURE — 83735 ASSAY OF MAGNESIUM: CPT

## 2020-12-11 PROCEDURE — 2060000000 HC ICU INTERMEDIATE R&B

## 2020-12-11 PROCEDURE — 6360000002 HC RX W HCPCS: Performed by: INTERNAL MEDICINE

## 2020-12-11 PROCEDURE — 2580000003 HC RX 258: Performed by: INTERNAL MEDICINE

## 2020-12-11 PROCEDURE — 71046 X-RAY EXAM CHEST 2 VIEWS: CPT

## 2020-12-11 PROCEDURE — 80048 BASIC METABOLIC PNL TOTAL CA: CPT

## 2020-12-11 RX ORDER — BUMETANIDE 1 MG/1
1 TABLET ORAL 2 TIMES DAILY
Status: DISCONTINUED | OUTPATIENT
Start: 2020-12-11 | End: 2020-12-12 | Stop reason: HOSPADM

## 2020-12-11 RX ADMIN — Medication 10 ML: at 21:00

## 2020-12-11 RX ADMIN — Medication 1000 UNITS: at 09:00

## 2020-12-11 RX ADMIN — LEVOTHYROXINE SODIUM 50 MCG: 50 TABLET ORAL at 05:58

## 2020-12-11 RX ADMIN — BUMETANIDE 1 MG: 0.25 INJECTION, SOLUTION INTRAMUSCULAR; INTRAVENOUS at 09:02

## 2020-12-11 RX ADMIN — Medication 10 ML: at 09:00

## 2020-12-11 RX ADMIN — ASPIRIN 81 MG CHEWABLE TABLET 81 MG: 81 TABLET CHEWABLE at 09:00

## 2020-12-11 RX ADMIN — SACUBITRIL AND VALSARTAN 1 TABLET: 24; 26 TABLET, FILM COATED ORAL at 08:59

## 2020-12-11 RX ADMIN — BENZONATATE 200 MG: 100 CAPSULE ORAL at 09:00

## 2020-12-11 RX ADMIN — ENOXAPARIN SODIUM 40 MG: 40 INJECTION SUBCUTANEOUS at 09:00

## 2020-12-11 RX ADMIN — SACUBITRIL AND VALSARTAN 1 TABLET: 24; 26 TABLET, FILM COATED ORAL at 21:00

## 2020-12-11 ASSESSMENT — PAIN SCALES - GENERAL
PAINLEVEL_OUTOF10: 0
PAINLEVEL_OUTOF10: 0

## 2020-12-11 NOTE — CARE COORDINATION
Social Work/Discharge Planning:  Met with patient and confirmed plan remains home at discharge. He denies any home care needs at this time. Will continue to follow.   Electronically signed by СЕРГЕЙ Hernandez on 12/11/2020 at 1:58 PM

## 2020-12-11 NOTE — PROGRESS NOTES
completed shifts: In: 56 [P.O.:600; I.V.:10]  Out: 950 [Urine:950]  No intake/output data recorded. Results:  CBC:   No results for input(s): WBC, HGB, HCT, MCV, PLT in the last 72 hours. BMP:   Recent Labs     12/09/20  0525 12/10/20  1200 12/11/20  0555    131* 134   K 4.2 4.1 4.0    99 103   CO2 21* 21* 21*   BUN 22 23 24*   CREATININE 1.3* 1.1 1.2     LFT: No results for input(s): ALKPHOS, ALT, AST, PROT, BILITOT, BILIDIR, LABALBU in the last 72 hours. PT/INR: No results for input(s): PROTIME, INR in the last 72 hours. Procalcitonin:   Recent Labs     12/09/20  0525   PROCAL 0.12*     Cultures:  Results for Noemy Gilmore (MRN [id number]) as of 12/8/2020 09:27    Ref.  Range 12/7/2020 13:58   Influenza A by PCR Latest Ref Range: Not Detected  Not Detected   Influenza B by PCR Latest Ref Range: Not Detected  Not Detected   Adenovirus by PCR Latest Ref Range: Not Detected  Not Detected   Coronavirus 229E by PCR Latest Ref Range: Not Detected  Not Detected   Coronavirus HKU1 by PCR Latest Ref Range: Not Detected  Not Detected   Coronavirus NL63 by PCR Latest Ref Range: Not Detected  Not Detected   Coronavirus OC43 by PCR Latest Ref Range: Not Detected  Not Detected   Human Metapneumovirus by PCR Latest Ref Range: Not Detected  Not Detected   Human Rhinovirus/Enterovirus by PCR Latest Ref Range: Not Detected  Not Detected   Parainfluenza Virus 1 by PCR Latest Ref Range: Not Detected  Not Detected   Parainfluenza Virus 2 by PCR Latest Ref Range: Not Detected  Not Detected   Parainfluenza Virus 3 by PCR Latest Ref Range: Not Detected  Not Detected   Parainfluenza Virus 4 by PCR Latest Ref Range: Not Detected  Not Detected   Respiratory Syncytial Virus by PCR Latest Ref Range: Not Detected  Not Detected   Bordetella parapertussis by PCR Latest Ref Range: Not Detected  Not Detected   Chlamydophilia pneumoniae by PCR Latest Ref Range: Not Detected  Not Detected   Mycoplasma pneumoniae by PCR Latest Ref Range: Not Detected  Not Detected   SARS-CoV-2, PCR Latest Ref Range: Not Detected  Not Detected   Bordetella pertussis by PCR Latest Ref Range: Not Detected  Not Detected            ABG:   No results for input(s): PH, PO2, PCO2, HCO3, BE, O2SAT in the last 72 hours. Films:  Xr Chest Portable    Result Date: 12/7/2020  EXAMINATION: ONE XRAY VIEW OF THE CHEST 12/7/2020 2:33 pm COMPARISON: None. HISTORY: ORDERING SYSTEM PROVIDED HISTORY: Shortness of breath TECHNOLOGIST PROVIDED HISTORY: Reason for exam:->Shortness of breath FINDINGS: The heart is enlarged. Patchy bilateral airspace disease is noted. There is a more consolidative infiltrate seen within the right lower lobe. There is no left pleural effusion. 1. Cardiomegaly with findings of patchy bilateral airspace disease which could represent CHF or pneumonia 2. More consolidative infiltrate seen within the right lung base favored to represent pneumonia.              Echo not available but reported from Dr. Lela Orona note as :  severe LV dysfunction: 10-15% EF, Indeterminate DD, Mild AR , Moderately severe to severe MR, Mod TR, Severe pulmonary htn.     Assessment:  · Dyspnea  · Volume overload  · Bilateral  pleural effusion R>L        Plan:  · Keep POX 90-95%, on room air  · Cardiology following, on IV bumex and Entresto. · CXR 12/7  with small right effusion/atelectasis   · Continue IS for pulmonary hygiene   · Will plan outpatient PFT and PSG   · To follow up in 1-2 weeks with [facility]     Hypotension so diuresis has been difficult. Now with increased dyspnea and cough overnight. Will check CXR now. Electronically signed by SHONNA Reid CNP on 12/11/2020 at 7:29 AM    12:04 CXR viewed showing tiny left effusion and resolving right effusion. No indication for thoracentesis. Agree with PCP, to have walking and overnight oximetry.

## 2020-12-11 NOTE — PROGRESS NOTES
[facility] CARDIOLOGY PROGRESS NOTE  [facility]        Subjective: Acute on chronic systolic CHF LVEF 19%, chronic CAD, mitral valve and tricuspid valve regurgitation severe pulmonary hypertension. Ambulated today without any dizziness or lightheadedness, systolic blood pressure around 90. He denies any chest pain. Ate well without nausea or vomiting. Objective: Chart labs medications and telemetry are reviewed. Patient Vitals for the past 24 hrs:   BP Temp Temp src Pulse Resp SpO2 Weight   12/11/20 1441 88/62 97.8 °F (36.6 °C) Oral 89 16 -- --   12/11/20 0845 99/64 97.4 °F (36.3 °C) Axillary 94 16 93 % --   12/11/20 0521 -- -- -- -- -- -- 194 lb 10.7 oz (88.3 kg)   12/11/20 0010 (!) 88/59 97.6 °F (36.4 °C) Oral 89 16 -- --   12/10/20 2115 84/72 -- -- 89 -- 94 % --   12/10/20 1638 88/62 -- -- -- -- -- --   12/10/20 1459 87/65 97.8 °F (36.6 °C) Axillary 87 17 -- --         Intake/Output Summary (Last 24 hours) at 12/11/2020 1458  Last data filed at 12/11/2020 1457  Gross per 24 hour   Intake 670 ml   Output 1650 ml   Net -980 ml       Wt Readings from Last 3 Encounters:   12/11/20 194 lb 10.7 oz (88.3 kg)   12/07/20 185 lb (83.9 kg)   10/21/20 188 lb (85.3 kg)       Telemetry: I personally reviewed and shows normal sinus rhythm heart rate of 90. Current meds: Scheduled Meds:   bumetanide  1 mg Intravenous Daily    Vitamin D  1,000 Units Oral Daily    enoxaparin  40 mg Subcutaneous Daily    sodium chloride flush  10 mL Intravenous 2 times per day    levothyroxine  50 mcg Oral Daily    aspirin  81 mg Oral Daily    sacubitril-valsartan  1 tablet Oral BID    carvedilol  3.125 mg Oral BID WC     Continuous Infusions:  PRN Meds:.benzonatate, acetaminophen **OR** acetaminophen, polyethylene glycol, sodium chloride flush    Allergies: Beta adrenergic blockers and Statins      Labs: No results for input(s): WBC, HGB, HCT, MCV, PLT in the last 72 hours.     Labs:   Recent Labs     12/09/20  9993 12/10/20  1200 12/11/20  0555    131* 134   K 4.2 4.1 4.0    99 103   CO2 21* 21* 21*   BUN 22 23 24*   CREATININE 1.3* 1.1 1.2       Labs: No results for input(s): CKTOTAL, CKMB, CKMBINDEX, TROPONINI in the last 72 hours. Labs: No results for input(s): BNP in the last 72 hours. Labs: No results for input(s): CHOL, HDL, TRIG in the last 72 hours. Invalid input(s): CHOLHDLR, LDLCALCU    Labs: No results for input(s): PROT, INR in the last 72 hours. Review of systems: No reported significant weight gain or weight loss. no dysuria or frequency, no dizziness, falls or trauma, no change in bowel or bladder habits, no hematochezia, hemoptysis or hematuria. No fevers, chills, nausea or vomiting reported. No significant wheezing or sputum production. No headache or visual changes. The remainder of the 10 review of systems otherwise negative. Exam      General: Patient comfortable in no distress and currently denies any chest pain. HEENT: Face symmetrical and no apparent cranial nerve deficit. Neck: No jugular venous distention, carotid bruit or thyromegaly. Lungs: Clear bilaterally without rales, wheezes or dullness. Cardiac: Regular rate and rhythm, no S3, S4, no rub or gallop. Abdomen: No rebound or guarding, no hepatosplenomegaly. Extremities: Without significant clubbing , cyanosis, or edema. Neuro:  No focal motor or sensory deficit apparent. Skin: No petechiae, no significant bruising. Assessment: See plan below        Plan: #1 acute systolic CHF and mobilizing fluid gradually and I told him it will take certainly a few weeks to mobilize any significant fluid as I suspect by history he is gradually pick this up over couple of months. CHF education again reviewed. Continue low-dose carvedilol and Entresto. Switch Bumex to 1 mg p.o. twice a day starting tomorrow. #2 nonsustained VT and no indication of syncope or near syncope.     #3 chronic CAD myocardial infarction 1994. Continue medical management. Continue carvedilol 3.125 mg twice a day, aspirin 81 mg daily, has previously refused statin. #4  3+ MR/2+ TR. Afterload reduction systolic blood pressure in the 86Q and 74G diastolic in the 57Q so mean blood pressure in the upper 60s. #5 severe pulmonary hypertension. May also be causing the chronic cough that he has. Have afterload reduce and blood pressure on the low side. Plan discharge to home tomorrow from cardiology viewpoint. He already has an appointment to follow-up in the next week or 2 in our office and asked him to keep it.       Electronically signed by Katlyn Dominguez MD on 12/11/2020 at 2:58 PM

## 2020-12-11 NOTE — PROGRESS NOTES
Admit Date: 12/7/2020    Subjective:     Patient seen this am. Discussed with Dr. Collin Magallon at length yesterday. Hypotensive overnight again. Feels a bit more dyspneic today. No BP check yet this am.      Scheduled Meds:   bumetanide  1 mg Intravenous Daily    Vitamin D  1,000 Units Oral Daily    enoxaparin  40 mg Subcutaneous Daily    sodium chloride flush  10 mL Intravenous 2 times per day    levothyroxine  50 mcg Oral Daily    aspirin  81 mg Oral Daily    sacubitril-valsartan  1 tablet Oral BID    carvedilol  3.125 mg Oral BID WC     Continuous Infusions:  PRN Meds:benzonatate, acetaminophen **OR** acetaminophen, polyethylene glycol, sodium chloride flush      Objective:     I/O last 3 completed shifts: In: 56 [P.O.:600; I.V.:10]  Out: 950 [Urine:950]  No intake/output data recorded. BP (!) 88/59   Pulse 89   Temp 97.6 °F (36.4 °C) (Oral)   Resp 16   Ht 5' 8\" (1.727 m)   Wt 194 lb 10.7 oz (88.3 kg)   SpO2 94%   BMI 29.60 kg/m²     Neck: no adenopathy, no carotid bruit, no JVD, supple, symmetrical, trachea midline and thyroid not enlarged, no tenderness/mass/nodules  Lungs: Clear to A and P. Few crackles R base.   Heart: regular rate and rhythm, S1, S2 normal, no murmur, regular rate and rhythm ,no precordial heave  Abdomen:soft, non-tender; non-distended normal bowel sounds no masses, no organomegaly  Extremities:Pedal edema Trace  Homans sign is negative, no sign of DVT        Data Review    CBC with Differential:    Lab Results   Component Value Date    WBC 7.9 12/08/2020    RBC 4.39 12/08/2020    HGB 13.1 12/08/2020    HCT 39.7 12/08/2020     12/08/2020    MCV 90.4 12/08/2020    MCH 29.8 12/08/2020    MCHC 33.0 12/08/2020    RDW 14.8 12/08/2020    LYMPHOPCT 14.9 12/07/2020    MONOPCT 8.2 12/07/2020    EOSPCT 2.0 07/23/2019    BASOPCT 0.4 12/07/2020    MONOSABS 0.62 12/07/2020    LYMPHSABS 1.13 12/07/2020    EOSABS 0.04 12/07/2020    BASOSABS 0.03 12/07/2020     BMP:    Lab Results Component Value Date     12/11/2020    K 4.0 12/11/2020    K 4.1 12/07/2020     12/11/2020    CO2 21 12/11/2020    BUN 24 12/11/2020    LABALBU 4.1 07/23/2019    CREATININE 1.2 12/11/2020    CALCIUM 9.4 12/11/2020    GFRAA >60 12/11/2020    LABGLOM 60 12/11/2020    GLUCOSE 106 12/11/2020       XR CHEST (2 VW)    (Results Pending)       Assessment:     -CHF-severe systolic dysfunction, severe pulm htn, mod severe to severe MR, mod TR on echo 12/3/20 @ heart [place].   -Hx CAD with atherectomy 1996  -Hypertension  -Chronic cough- ACEI changed to ARB X1 month, no improvement. Did not tolerate PPI.       Plan:     Unlikely home today. Will await CXR, cardiology input. Will check resting and exercise pulse ox. Follow labs.       Electronically signed by Duong Srinivasan MD on 12/11/2020 at 8:03 AM

## 2020-12-12 VITALS
SYSTOLIC BLOOD PRESSURE: 98 MMHG | OXYGEN SATURATION: 97 % | BODY MASS INDEX: 28.81 KG/M2 | TEMPERATURE: 97.9 F | RESPIRATION RATE: 16 BRPM | HEIGHT: 68 IN | WEIGHT: 190.1 LBS | DIASTOLIC BLOOD PRESSURE: 62 MMHG | HEART RATE: 96 BPM

## 2020-12-12 LAB
ANION GAP SERPL CALCULATED.3IONS-SCNC: 13 MMOL/L (ref 7–16)
BUN BLDV-MCNC: 24 MG/DL (ref 8–23)
CALCIUM SERPL-MCNC: 9.1 MG/DL (ref 8.6–10.2)
CHLORIDE BLD-SCNC: 97 MMOL/L (ref 98–107)
CO2: 22 MMOL/L (ref 22–29)
CREAT SERPL-MCNC: 1.2 MG/DL (ref 0.7–1.2)
GFR AFRICAN AMERICAN: >60
GFR NON-AFRICAN AMERICAN: 60 ML/MIN/1.73
GLUCOSE BLD-MCNC: 111 MG/DL (ref 74–99)
MAGNESIUM: 2.1 MG/DL (ref 1.6–2.6)
POTASSIUM SERPL-SCNC: 3.7 MMOL/L (ref 3.5–5)
SODIUM BLD-SCNC: 132 MMOL/L (ref 132–146)

## 2020-12-12 PROCEDURE — 6370000000 HC RX 637 (ALT 250 FOR IP): Performed by: INTERNAL MEDICINE

## 2020-12-12 PROCEDURE — 36415 COLL VENOUS BLD VENIPUNCTURE: CPT

## 2020-12-12 PROCEDURE — 83735 ASSAY OF MAGNESIUM: CPT

## 2020-12-12 PROCEDURE — 2580000003 HC RX 258: Performed by: INTERNAL MEDICINE

## 2020-12-12 PROCEDURE — 80048 BASIC METABOLIC PNL TOTAL CA: CPT

## 2020-12-12 RX ORDER — BUMETANIDE 1 MG/1
1 TABLET ORAL 2 TIMES DAILY
Qty: 30 TABLET | Refills: 3 | Status: SHIPPED | OUTPATIENT
Start: 2020-12-12 | End: 2022-02-24 | Stop reason: ALTCHOICE

## 2020-12-12 RX ORDER — CARVEDILOL 3.12 MG/1
3.12 TABLET ORAL 2 TIMES DAILY WITH MEALS
Qty: 60 TABLET | Refills: 3 | Status: SHIPPED | OUTPATIENT
Start: 2020-12-12 | End: 2021-01-28

## 2020-12-12 RX ORDER — BENZONATATE 200 MG/1
200 CAPSULE ORAL 3 TIMES DAILY PRN
Qty: 50 CAPSULE | Refills: 1 | Status: SHIPPED | OUTPATIENT
Start: 2020-12-12 | End: 2020-12-19

## 2020-12-12 RX ADMIN — LEVOTHYROXINE SODIUM 50 MCG: 50 TABLET ORAL at 06:30

## 2020-12-12 RX ADMIN — SACUBITRIL AND VALSARTAN 1 TABLET: 24; 26 TABLET, FILM COATED ORAL at 09:27

## 2020-12-12 RX ADMIN — ASPIRIN 81 MG CHEWABLE TABLET 81 MG: 81 TABLET CHEWABLE at 09:28

## 2020-12-12 RX ADMIN — BENZONATATE 200 MG: 100 CAPSULE ORAL at 09:27

## 2020-12-12 RX ADMIN — Medication 1000 UNITS: at 09:27

## 2020-12-12 RX ADMIN — Medication 10 ML: at 09:28

## 2020-12-12 RX ADMIN — BUMETANIDE 1 MG: 1 TABLET ORAL at 09:27

## 2020-12-12 ASSESSMENT — PAIN SCALES - GENERAL: PAINLEVEL_OUTOF10: 0

## 2020-12-12 NOTE — DISCHARGE SUMMARY
Patient ID: Shanda Mccrary      Patient's PCP: Suhas Gonzalez MD    Admit Date: 12/7/2020   Discharge Date: 12/12/2020  Admitting Physician: Krista Farmer MD  Discharge Physician: Suhas Gonzalez   Discharge Diagnoses: Acute on chronic systolic CHF  Patient Active Problem List   Diagnosis Code    Hypothyroid E03.9    Benign essential hypertension I10    Abnormal echocardiogram R93.1    CHF (congestive heart failure), NYHA class I, acute on chronic, combined [facility]) I50.43       Hospital Course: The patient is a 69 y.o. male with history of Hth, CAD with atherectomy 1996,  who presents with 3 months of varying symptoms. Initially seen in urgent care X2, In Sept, treated for URI sx. Presented to me on 10/21. With concern about covid 19 , and hypoxia, was sent to [facility] ER (see record). COVID testing negative at that time but CT A suggested small bilateraly effusions, small foci of pneumonia. No CT evidence of PE. Interestingly at that time, proBNP was 2,038. Treated with pneumonia. F/U CXR and lab showed resolution of pneumonia but continued concerns for CHF. At that point, started on lasix and referred urgently to cardiology. For some reason, pt missed appt. Subsequently, echo obtained which showed severe LV dysfunction: 10-15% EF, Indeterminate DD, Mild AR , Moderately severe to severe MR, Mod TR, Severe pulmonary htn. Appt with cardio was rescheduled for next week. Pt called complaining of increased dyspnea, orthopnea and was advised ER. CXR in ER: The heart is enlarged.  Patchy bilateral airspace disease is noted. Yolanda Crescencio is    a more consolidative infiltrate seen within the right lower lobe. Yolanda Crescencio is    no left pleural effusion. 1. Cardiomegaly with findings of patchy bilateral airspace disease which    could represent CHF or pneumonia    2. More consolidative infiltrate seen within the right lung base favored to    represent pneumonia.         .Started on IV lasix.  Pulmonary and cardiology consults obtained. Dr. Cynthia Arce saw patient. Fosinopril d/c'd. Entresto 24/26 mg initiated along with carvedilol 3.125 mg bid. Changed to IV and then po Bumex 1 mg bid at d/c. Pulmonary consult obtained, Dr. Raina Thompson. Agreed no clinical pneumonia despite x ray findings. Procalcitonin normal. Suggested additonal oupatient w/u including PFT/PSG. Responded to treatment. Weaned off oxygen. Did fine on resting and exercise pulse ox. D/C home to f/u with myself, Dr. Cynthia Arce and Dr. Raina Thompson.         Physical Exam:  BP 98/62   Pulse 96   Temp 97.9 °F (36.6 °C) (Oral)   Resp 16   Ht 5' 8\" (1.727 m)   Wt 190 lb 1.6 oz (86.2 kg)   SpO2 97%   BMI 28.90 kg/m²   General appearance: alert, appears stated age and cooperative  Head: Normocephalic, without obvious abnormality, atraumatic  Eyes: conjunctivae/corneas clear. PERRL, EOM's intact. Ears: External ears -normal  Nose: No drainage or sinus tenderness. Throat: lips, mucosa, and tongue normal; teeth and gums normal  Neck: no adenopathy, no carotid bruit, no JVD, supple, symmetrical, trachea midline and thyroid not enlarged, symmetric, no tenderness/mass/nodules  Lungs: clear to auscultation bilaterally. Few R base crackles  Heart: regular rate and rhythm, S1, S2 normal, no murmur,   Abdomen: soft, non-tender; bowel sounds normal; no masses,  no organomegaly  Extremities: extremities normal, atraumatic, no cyanosis. Trace edema  Neurologic: Grossly normal    XR CHEST (2 VW)   Final Result   Diminished right-sided pleural effusions since December 7. The small   left-sided pleural effusion observed. Consults: cardiology and pulmonary/intensive care  Treatments: cardiac meds: entresto, carvedilol.  IV lasix to bumex  Disposition: home  Discharged Condition: Stable  Follow Up: Primary Care Physician in two weeks  Discharge Medications:   Cachorro Kwong   Home Medication Instructions RCB:[id number]    Printed on:12/12/20 1009   Medication Information                      aspirin 81 MG tablet  Take 81 mg by mouth daily             benzonatate (TESSALON) 200 MG capsule  Take 1 capsule by mouth 3 times daily as needed for Cough             bumetanide (BUMEX) 1 MG tablet  Take 1 tablet by mouth 2 times daily             carvedilol (COREG) 3.125 MG tablet  Take 1 tablet by mouth 2 times daily (with meals)             Cholecalciferol (VITAMIN D3) 1000 units TABS  Take by mouth             FISH OIL  by Does not apply route. levothyroxine (SYNTHROID) 50 MCG tablet  Take 50 mcg by mouth Daily             Multiple Vitamins-Minerals (MULTIVITAMIN ADULT PO)  Take by mouth             nitroGLYCERIN (NITROSTAT) 0.4 MG SL tablet  Place 0.4 mg under the tongue every 5 minutes as needed for Chest pain Dissolve 1 tab under tongue at first sign of chest pain. May repeat every 5 minutes until relief is obtained. If pain persists after taking 3 tabs in a 15-minute period, or the pain is different than is typically experienced, call 9-1-1 immediately. sacubitril-valsartan (ENTRESTO) 24-26 MG per tablet  Take 1 tablet by mouth 2 times daily                Activity: activity as tolerated  Diet: cardiac dietWound Care: none needed  Time Spent on discharge is more than 30 minutes discussing plan of care and discharge medications. -CHF-severe systolic dysfunction, severe pulm htn, mod severe to severe MR, mod TR on echo 12/3/20 @ heart [place].   -Hx CAD with atherectomy 1996  -Hypertension  -Chronic cough- ACEI changed to ARB X1 month, no improvement.  Did not tolerate PPI.       Signed:  Electronically signed by Saul Nelson MD on 12/12/2020 at 10:03 AM

## 2020-12-12 NOTE — PROGRESS NOTES
Pulmonary Progress Note    Admit Date: 2020                            PCP: Genna Rich MD  Active Problems:    CHF (congestive heart failure), NYHA class I, acute on chronic, combined ([address]  Resolved Problems:    * No resolved hospital problems. *      Subjective:  Seen up at bedside. On room air. He reports overnight when lying in bed, was more short of breath, he put his O2 back on. He felt more congested. Once up and walking in room, symptoms improved some, but not back to baseline. Increased congested cough, but non-productive. No fever. Medications:       bumetanide  1 mg Oral BID    Vitamin D  1,000 Units Oral Daily    enoxaparin  40 mg Subcutaneous Daily    sodium chloride flush  10 mL Intravenous 2 times per day    levothyroxine  50 mcg Oral Daily    aspirin  81 mg Oral Daily    sacubitril-valsartan  1 tablet Oral BID    carvedilol  3.125 mg Oral BID WC       Vitals:  VITALS:  BP 98/62   Pulse 96   Temp 97.9 °F (36.6 °C) (Oral)   Resp 16   Ht 5' 8\" (1.727 m)   Wt 190 lb 1.6 oz (86.2 kg)   SpO2 97%   BMI 28.90 kg/m²   24HR INTAKE/OUTPUT:      Intake/Output Summary (Last 24 hours) at 2020 09  Last data filed at 2020 6255  Gross per 24 hour   Intake 490 ml   Output 1675 ml   Net -1185 ml     CURRENT PULSE OXIMETRY:  SpO2: 97 %  24HR PULSE OXIMETRY RANGE:  SpO2  Av.5 %  Min: 96 %  Max: 97 %  CVP:    VENT SETTINGS:   Vent Information  Skin Assessment: Clean, dry, & intact  SpO2: 97 %  Additional Respiratory  Assessments  Pulse: 96  Resp: 16  SpO2: 97 %      EXAM:  General: Alert, in NAD  Neck: Supple, Trachea midline. Resp: No accessory muscle use. Non-labored. Increased Left basilar rales since yesterday  CV: Regular rate. Regular rhythm. No murmur No edema. ABD: Non-tender. Non-distended. Soft, round . Normal bowel sounds. Skin: Warm and dry. Neuro: Awake. Follows commands.  O x 3, SIMMONS  Psych:  calm and interactive     I/O: I/O last 3 completed shifts: In: 250 [P.O.:240; I.V.:10]  Out: 1675 [Urine:1675]  I/O this shift:  In: 240 [P.O.:240]  Out: -      Results:  CBC:   No results for input(s): WBC, HGB, HCT, MCV, PLT in the last 72 hours. BMP:   Recent Labs     12/10/20  1200 12/11/20  0555 12/12/20  0300   * 134 132   K 4.1 4.0 3.7   CL 99 103 97*   CO2 21* 21* 22   BUN 23 24* 24*   CREATININE 1.1 1.2 1.2     LFT: No results for input(s): ALKPHOS, ALT, AST, PROT, BILITOT, BILIDIR, LABALBU in the last 72 hours. PT/INR: No results for input(s): PROTIME, INR in the last 72 hours. Procalcitonin:   No results for input(s): PROCAL in the last 72 hours. Cultures:  Results for Michelle Toribio (MRN [id number]) as of 12/8/2020 09:27    Ref.  Range 12/7/2020 13:58   Influenza A by PCR Latest Ref Range: Not Detected  Not Detected   Influenza B by PCR Latest Ref Range: Not Detected  Not Detected   Adenovirus by PCR Latest Ref Range: Not Detected  Not Detected   Coronavirus 229E by PCR Latest Ref Range: Not Detected  Not Detected   Coronavirus HKU1 by PCR Latest Ref Range: Not Detected  Not Detected   Coronavirus NL63 by PCR Latest Ref Range: Not Detected  Not Detected   Coronavirus OC43 by PCR Latest Ref Range: Not Detected  Not Detected   Human Metapneumovirus by PCR Latest Ref Range: Not Detected  Not Detected   Human Rhinovirus/Enterovirus by PCR Latest Ref Range: Not Detected  Not Detected   Parainfluenza Virus 1 by PCR Latest Ref Range: Not Detected  Not Detected   Parainfluenza Virus 2 by PCR Latest Ref Range: Not Detected  Not Detected   Parainfluenza Virus 3 by PCR Latest Ref Range: Not Detected  Not Detected   Parainfluenza Virus 4 by PCR Latest Ref Range: Not Detected  Not Detected   Respiratory Syncytial Virus by PCR Latest Ref Range: Not Detected  Not Detected   Bordetella parapertussis by PCR Latest Ref Range: Not Detected  Not Detected   Chlamydophilia pneumoniae by PCR Latest Ref Range: Not Detected  Not Detected   Mycoplasma pneumoniae by PCR [place]  12/12/2020  9:22 AM

## 2021-01-28 ENCOUNTER — OFFICE VISIT (OUTPATIENT)
Dept: ENT CLINIC | Age: 71
End: 2021-01-28
Payer: MEDICARE

## 2021-01-28 DIAGNOSIS — R05.3 CHRONIC COUGH: Primary | ICD-10-CM

## 2021-01-28 DIAGNOSIS — R09.82 PND (POST-NASAL DRIP): ICD-10-CM

## 2021-01-28 PROCEDURE — 31575 DIAGNOSTIC LARYNGOSCOPY: CPT | Performed by: OTOLARYNGOLOGY

## 2021-01-28 PROCEDURE — 99204 OFFICE O/P NEW MOD 45 MIN: CPT | Performed by: OTOLARYNGOLOGY

## 2021-01-28 RX ORDER — FLUTICASONE PROPIONATE 50 MCG
1 SPRAY, SUSPENSION (ML) NASAL DAILY
COMMUNITY
End: 2021-11-09 | Stop reason: ALTCHOICE

## 2021-01-28 RX ORDER — SPIRONOLACTONE 25 MG/1
TABLET ORAL
COMMUNITY
Start: 2020-12-18 | End: 2022-02-24 | Stop reason: ALTCHOICE

## 2021-01-28 RX ORDER — LORATADINE 10 MG/1
10 TABLET ORAL DAILY
COMMUNITY
End: 2021-11-09 | Stop reason: ALTCHOICE

## 2021-01-28 RX ORDER — AZELASTINE 1 MG/ML
2 SPRAY, METERED NASAL 2 TIMES DAILY
Qty: 4 BOTTLE | Refills: 1 | Status: SHIPPED
Start: 2021-01-28 | End: 2021-11-09 | Stop reason: ALTCHOICE

## 2021-01-28 NOTE — PROGRESS NOTES
DEPARTMENT OF OTOLARYNGOLOGY   HISTORY AND PHYSICAL      PATIENT: Beena Sofia : 1950 (49 y.o.)   DATE: 2021  REFERRED BY: Allan Briceño MD  [address] [facility]      HISTORY OBTAINED FROM:  patient    CHIEF COMPLAINT:  had concerns including New Patient (chronic cough, ). HISTORY OF PRESENT ILLNESS:                                                                                        Beena Sofia is a(n) 69 y.o. male with significant past medical history of CAD, CHF, HLD presents to the office today as a new patient for evaluation of his  Chronic cough that started about 5 years ago. Initially was thought be induced by ACE-I, but this has been stopped. He continues to have coughing episodes after eating but not drinking. The patient has been on Flonase and Claritin with some improvement. Denies swallowing issues or pain when swallow. Denies sore throat. Some globus sensation. Denies having GERD. Clear his throat often. Denies f/c.      Past Medical History:   Diagnosis Date    CAD (coronary artery disease)     ATHERECTOMY    CH[facility]congestive heart failure) ([facility])     History of cardiovascular stress test 2012    STRESS ECHO    Hyperlipidemia     Thyroid disease         Past Surgical History:   Procedure Laterality Date    FRACTURE SURGERY      RIGHT ANKLE REPAIR         Current Outpatient Medications:     fluticasone (FLONASE) 50 MCG/ACT nasal spray, 1 spray by Each Nostril route daily, Disp: , Rfl:     loratadine (CLARITIN) 10 MG tablet, Take 10 mg by mouth daily, Disp: , Rfl:     sacubitril-valsartan (ENTRESTO) 24-26 MG per tablet, Take 1 tablet by mouth 2 times daily, Disp: 60 tablet, Rfl: 3    bumetanide (BUMEX) 1 MG tablet, Take 1 tablet by mouth 2 times daily, Disp: 30 tablet, Rfl: 3    Multiple Vitamins-Minerals (MULTIVITAMIN ADULT PO), Take by mouth, Disp: , Rfl:     levothyroxine (SYNTHROID) 50 MCG tablet, Take 50 mcg by mouth Daily, Disp: , Rfl:     nitroGLYCERIN (NITROSTAT) 0.4 MG SL tablet, Place 0.4 mg under the tongue every 5 minutes as needed for Chest pain Dissolve 1 tab under tongue at first sign of chest pain. May repeat every 5 minutes until relief is obtained. If pain persists after taking 3 tabs in a 15-minute period, or the pain is different than is typically experienced, call 9-1-1 immediately. , Disp: , Rfl:     aspirin 81 MG tablet, Take 81 mg by mouth daily, Disp: , Rfl:     Cholecalciferol (VITAMIN D3) 1000 units TABS, Take by mouth, Disp: , Rfl:     FISH OIL, by Does not apply route.  , Disp: , Rfl:     spironolactone (ALDACTONE) 25 MG tablet, TAKE 1 TABLET BY MOUTH EVERY DAY, Disp: , Rfl:     Allergies   Allergen Reactions    Beta Adrenergic Blockers Other (See Comments)     Disorientation, blood pressure increased      Statins Other (See Comments)     Cramping         History reviewed. No pertinent family history.     Social History     Socioeconomic History    Marital status:      Spouse name: Not on file    Number of children: Not on file    Years of education: Not on file    Highest education level: Not on file   Occupational History    Not on file   Social Needs    Financial resource strain: Not on file    Food insecurity     Worry: Not on file     Inability: Not on file    Transportation needs     Medical: Not on file     Non-medical: Not on file   Tobacco Use    Smoking status: Former Smoker     Packs/day: 1.00     Years: 20.00     Pack years: 20.00     Quit date:      Years since quittin.0    Smokeless tobacco: Former User   Substance and Sexual Activity    Alcohol use: Not Currently    Drug use: Never    Sexual activity: Not on file   Lifestyle    Physical activity     Days per week: Not on file     Minutes per session: Not on file    Stress: Not on file   Relationships    Social connections     Talks on phone: Not on file     Gets together: Not on file     Attends Sikhism service: Not on file     Active member of club or organization: Not on file     Attends meetings of clubs or organizations: Not on file     Relationship status: Not on file    Intimate partner violence     Fear of current or ex partner: Not on file     Emotionally abused: Not on file     Physically abused: Not on file     Forced sexual activity: Not on file   Other Topics Concern    Not on file   Social History Narrative    Not on file       REVIEW OF SYSTEMS:  Review of Systems  Constitutional: Negative for chills and fever. Eyes: Negative for discharge and itching. ENT: Negative for congestion, ear discharge, ear pain, hearing loss and sore throat. Respiratory: Negative for chest tightness and shortness of breath. Cardiovascular: Negative for chestpain and palpitations. Gastrointestinal: Negative for abdominal distention and abdominal pain. Endocrinology: Negative for heat and cold intolerance. Neurological: Negative for focal weaknessor seizure   Skin: Negative for rash and itchiness   Psychiatric: Negative for depression and hallucinations     PHYSICAL EXAM:                                                                                                                There were no vitals taken for this visit. Physical Exam  Constitutional: Appears well-developed and well-nourished. Appears as stated age.    Eyes: Lids and lashes normal, pupils equal, extra ocular muscles intact, sclera clear   ENT: Sinuses nontender on palpation, external ears and ear canals without lesions, left tympanic membrane tympanic membrane intact without effusion or masses, right tympanic membrane tympanic membrane intact without effusion or masses, nasal septal mild deviation, bilateral inferior turbinates within normal limits, no rhinorrhea, lips normal, god dentition, gums normal, oral pharynx with moist mucus membranes, tonsils +0 without erythema or exudates, normal voice   Neck:  Supple, symmetrical, trachea midline, no adenopathy, thyroid symmetric, not enlarged and no tenderness, skin normal   Respiratory: No increased work of breathing. No stridor or wheezes   Cardiovascular: Regular rate   Skin: Warm and dry   Neurologic:  Alert and oriented, CN II-XII grossly intact     Endoscopy Procedure Note  Endoscopy Type:  Flexible nasopharyngolaryngoscopy  Procedure Details   The left side(s) of the nose was topically anesthetized with spray. The nasal cavities were inspected to determine which side would be more amenable to the scope being passed through. After waiting an appropriate period of time for anesthesia/ vasoconstriction to become effective, the flexible nasopharyngolaryngoscope was passed through the bilateral side(s) of the nose, and the nose, nasopharynx, oropharynx, hypopharynx and larynx were examined. Examination was performed during quiet respiration and with phonation. The following findings were noted. Findings:  Normal nasopharynx, normal epiglottis, normal tongue base, normal pyriform, normal TVC motion and mucosa, no subglottic masses or lesions. Condition:  Stable  Complications:  None      ASSESSMENTAND PLAN:                                                                                                  Diagnosis Orders   1. Chronic cough     2. PND (post-nasal drip)         · Scope WNL today, reviewed with patient. · Add Astelin in conjunction with Flonase, use concurrently  · Will consider PPI if patient's symptoms do not improve  · Follow up in 1-2 month(s) or sooner if symptoms acutely exacerbate    Patient was seen and examined with attending physician, who agrees with the assessment and plans. Anika Hassan DO  Resident Physician  [facility]  Otolaryngology Residency  1/28/2021  2:12 PM            Remberto Langley  1950    I have discussed the case, including pertinent history and exam findings with the resident.  I have seen and examined the patient and the key elements of the encounter have been performed by me. I agree with the assessment, plan and orders as documented by the  resident              Remainder of medical problems as per  resident note. Patient seen and examined. Agree with above exam, assessment and plan.       Electronically signed by Mohan Harrington DO on 1/29/21 at 3:36 PM EST\

## 2021-05-11 ENCOUNTER — TELEPHONE (OUTPATIENT)
Dept: CARDIAC CATH/INVASIVE PROCEDURES | Age: 71
End: 2021-05-11

## 2021-05-11 NOTE — TELEPHONE ENCOUNTER
Reminded patient of scheduled procedure on 5/12/21. Instructions given and COVID questionnaire completed. Patient has had COVID vaccine and will bring the card.

## 2021-05-12 ENCOUNTER — HOSPITAL ENCOUNTER (OUTPATIENT)
Dept: CARDIAC CATH/INVASIVE PROCEDURES | Age: 71
Discharge: HOME | End: 2021-05-12
Payer: MEDICARE

## 2021-05-12 VITALS
DIASTOLIC BLOOD PRESSURE: 87 MMHG | RESPIRATION RATE: 20 BRPM | SYSTOLIC BLOOD PRESSURE: 127 MMHG | HEART RATE: 103 BPM | OXYGEN SATURATION: 97 % | TEMPERATURE: 98.1 F | HEIGHT: 68 IN | BODY MASS INDEX: 26.83 KG/M2 | WEIGHT: 177 LBS

## 2021-05-12 DIAGNOSIS — Z01.810 PREOP CARDIOVASCULAR EXAM: Primary | ICD-10-CM

## 2021-05-12 LAB
ABO/RH: NORMAL
ANTIBODY SCREEN: NORMAL

## 2021-05-12 PROCEDURE — 6360000002 HC RX W HCPCS

## 2021-05-12 PROCEDURE — C1887 CATHETER, GUIDING: HCPCS

## 2021-05-12 PROCEDURE — 36415 COLL VENOUS BLD VENIPUNCTURE: CPT

## 2021-05-12 PROCEDURE — C1769 GUIDE WIRE: HCPCS

## 2021-05-12 PROCEDURE — 93458 L HRT ARTERY/VENTRICLE ANGIO: CPT | Performed by: INTERNAL MEDICINE

## 2021-05-12 PROCEDURE — 86901 BLOOD TYPING SEROLOGIC RH(D): CPT

## 2021-05-12 PROCEDURE — 2500000003 HC RX 250 WO HCPCS

## 2021-05-12 PROCEDURE — 86850 RBC ANTIBODY SCREEN: CPT

## 2021-05-12 PROCEDURE — C1894 INTRO/SHEATH, NON-LASER: HCPCS

## 2021-05-12 PROCEDURE — 86900 BLOOD TYPING SEROLOGIC ABO: CPT

## 2021-05-12 PROCEDURE — 2709999900 HC NON-CHARGEABLE SUPPLY

## 2021-05-12 PROCEDURE — 6370000000 HC RX 637 (ALT 250 FOR IP): Performed by: INTERNAL MEDICINE

## 2021-05-12 RX ORDER — ACETAMINOPHEN 325 MG/1
650 TABLET ORAL EVERY 4 HOURS PRN
Status: DISCONTINUED | OUTPATIENT
Start: 2021-05-12 | End: 2021-05-13 | Stop reason: HOSPADM

## 2021-05-12 RX ORDER — ASPIRIN 81 MG/1
243 TABLET, CHEWABLE ORAL ONCE
Status: COMPLETED | OUTPATIENT
Start: 2021-05-12 | End: 2021-05-12

## 2021-05-12 RX ADMIN — ASPIRIN 243 MG: 81 TABLET, CHEWABLE ORAL at 07:52

## 2021-05-12 NOTE — CONSULTS
[facility] [address] [place] [address] CARDIOLOGY    Name: Donnie Hernadez    Age: 69 y.o. Date of Admission: 5/12/2021  6:36 AM    Date of Service: 5/12/2021    Reason for Consultation: Severe three-vessel CAD, ischemic cardiomyopathy, mitral regurgitation    Referring Physician: Internal Medicine Hospitalists    History of Present Illness: The patient is a 69y.o. year old male with a severe cardiomyopathy diagnosed late 2020 and admitted at that time with acute systolic heart failure. Ejection fraction 15-20% with moderately severe mitral regurgitation and he has been treated medically although he has a severe intolerance to carvedilol. Heart catheterization today showed severe three-vessel coronary disease. Seen in the postoperative suite and without complaint. Past Medical History:   Hyperlipidemia, coronary disease, severe ischemic cardiomyopathy, mitral regurgitation. Review of Systems:     Constitutional: No fever, chills, sweats  Cardiac: As per HPI  Pulmonary: As per HPI  HEENT: No visual disturbances or difficult swallowing  GI: No nausea, vomiting, diarrhea, abdominal pain, rectal bleeding  : No dysuria or hematuria  Endocrine: No excessive thirst, heat or cold intolerance. Musculoskeletal: Joint pain but no muscle aches. No claudication  Skin: No skin breakdown or rashes  Neuro: No headache, confusion, or seizures  Psych: No depression, anxiety      Family History:  History reviewed. No pertinent family history.     Social History:  Social History     Socioeconomic History    Marital status:      Spouse name: Not on file    Number of children: Not on file    Years of education: Not on file    Highest education level: Not on file   Occupational History    Not on file   Social Needs    Financial resource strain: Not on file    Food insecurity     Worry: Not on file     Inability: Not on file    Transportation needs     Medical: Not on file Non-medical: Not on file   Tobacco Use    Smoking status: Former Smoker     Packs/day: 1.00     Years: 20.00     Pack years: 20.00     Quit date:      Years since quittin.3    Smokeless tobacco: Former User   Substance and Sexual Activity    Alcohol use: Not Currently    Drug use: Never    Sexual activity: Not on file   Lifestyle    Physical activity     Days per week: Not on file     Minutes per session: Not on file    Stress: Not on file   Relationships    Social connections     Talks on phone: Not on file     Gets together: Not on file     Attends Yarsanism service: Not on file     Active member of club or organization: Not on file     Attends meetings of clubs or organizations: Not on file     Relationship status: Not on file    Intimate partner violence     Fear of current or ex partner: Not on file     Emotionally abused: Not on file     Physically abused: Not on file     Forced sexual activity: Not on file   Other Topics Concern    Not on file   Social History Narrative    Not on file       Allergies:   Allergies   Allergen Reactions    Beta Adrenergic Blockers Other (See Comments)     Disorientation, blood pressure increased      Statins Other (See Comments)     Cramping         Current Medications:  Current Outpatient Medications   Medication Sig Dispense Refill    spironolactone (ALDACTONE) 25 MG tablet TAKE 1 TABLET BY MOUTH EVERY DAY      sacubitril-valsartan (ENTRESTO) 24-26 MG per tablet Take 1 tablet by mouth 2 times daily 60 tablet 3    bumetanide (BUMEX) 1 MG tablet Take 1 tablet by mouth 2 times daily 30 tablet 3    Multiple Vitamins-Minerals (MULTIVITAMIN ADULT PO) Take by mouth      levothyroxine (SYNTHROID) 50 MCG tablet Take 50 mcg by mouth Daily      aspirin 81 MG tablet Take 81 mg by mouth daily      Cholecalciferol (VITAMIN D3) 1000 units TABS Take by mouth      fluticasone (FLONASE) 50 MCG/ACT nasal spray 1 spray by Each Nostril route daily      loratadine (CLARITIN) 10 MG tablet Take 10 mg by mouth daily      azelastine (ASTELIN) 0.1 % nasal spray 2 sprays by Nasal route 2 times daily Use in each nostril as directed 4 Bottle 1    nitroGLYCERIN (NITROSTAT) 0.4 MG SL tablet Place 0.4 mg under the tongue every 5 minutes as needed for Chest pain Dissolve 1 tab under tongue at first sign of chest pain. May repeat every 5 minutes until relief is obtained. If pain persists after taking 3 tabs in a 15-minute period, or the pain is different than is typically experienced, call 9-1-1 immediately.  FISH OIL by Does not apply route. Current Facility-Administered Medications   Medication Dose Route Frequency Provider Last Rate Last Admin    acetaminophen (TYLENOL) tablet 650 mg  650 mg Oral Q4H PRN Millie Cavanaugh DO             Physical Exam:  /87   Pulse 103   Temp 98.1 °F (36.7 °C)   Resp 20   Ht 5' 8\" (1.727 m)   Wt 177 lb (80.3 kg)   SpO2 97% Comment: Room air  BMI 26.91 kg/m²   Weight change: Wt Readings from Last 3 Encounters:   05/12/21 177 lb (80.3 kg)   12/12/20 190 lb 1.6 oz (86.2 kg)   12/07/20 185 lb (83.9 kg)         General: Awake, alert, oriented x3, no acute distress  HEENT: Unremarkable  Neck: No JVD or bruits. Cardiac: Regular rate and rhythm, normal S1 and S2, no extra heart sounds, murmurs, heaves, thrills  Resp: Lungs clear without wheezing or crackles. No accessory muscle use or retraction  Abdomen: soft, nontender, nondistended, no gross organomegaly or mass  Skin: Warm and dry, no cyanosis. Musculoskeletal: normal tone and strength in the upper and lower extremities bilaterally  Neuro: Grossly unremarkable  Psych: Cooperative, and normal affect    Intake/Output:  No intake or output data in the 24 hours ending 05/12/21 1045  No intake/output data recorded.     Laboratory Tests:  Lab Results   Component Value Date    CREATININE 1.2 12/12/2020    BUN 24 (H) 12/12/2020     12/12/2020    K 3.7 12/12/2020    CL 97 (L) 12/12/2020    CO2 22 12/12/2020     No results for input(s): CKTOTAL, CKMB in the last 72 hours. Invalid input(s): TROPONONI  No results found for: BNP  Lab Results   Component Value Date    WBC 7.9 12/08/2020    RBC 4.39 12/08/2020    HGB 13.1 12/08/2020    HCT 39.7 12/08/2020    MCV 90.4 12/08/2020    MCH 29.8 12/08/2020    MCHC 33.0 12/08/2020    RDW 14.8 12/08/2020     12/08/2020    MPV 12.0 12/08/2020     No results for input(s): ALKPHOS, ALT, AST, PROT, BILITOT, BILIDIR, LABALBU in the last 72 hours. Lab Results   Component Value Date    MG 2.1 12/12/2020     No results found for: PROTIME, INR  Lab Results   Component Value Date    TSH 2.040 12/07/2020     No components found for: CHLPL  Lab Results   Component Value Date    TRIG 58 12/08/2020    TRIG 102 07/23/2019    TRIG 169 01/16/2019     Lab Results   Component Value Date    HDL 35 12/08/2020    HDL 46 07/23/2019    HDL 45 01/16/2019     Lab Results   Component Value Date    LDLCALC 24 12/08/2020    LDLCALC 135 07/23/2019    LDLCALC 160 01/16/2019     No results found for: INR    Admission ECG is pending. ASSESSMENT / PLAN:    #1.  Severe diffuse surgical coronary disease-in the setting of severe left ventricular dysfunction, requires CABG. May be able to be done as outpatient and he might be able to be discharged today. He is agitated and still trying to accept the fact that he requires CABG. Continue indefinite aspirin. #2.  Mitral gravitation-probably needs preoperative transesophageal echocardiogram to assess for severity of mitral rotation and he may require mitral valve repair concomitantly with CABG. #3.  Severe ischemic cardiomyopathy-unable to tolerate carvedilol but has never tried metoprolol. Would like to start low-dose metoprolol succinate with Entresto and Aldactone and as outpatient start Farxiga to complete his cardioprotective regimen. #4.   Hyperlipidemia-unable to tolerate statin medication although there are around 8 different statin medications. Would recommend low refined carbohydrate diet and avoidance of excessive sugar on this basis. #5.  Continue to follow.       Electronically signed by Nadira Sullivan MD on 5/12/2021 at 10:45 AM

## 2021-05-12 NOTE — PROCEDURES
Procedure:    1. Left heart cath    Physician: Eduardo Hidalgo. Cuca CURTIS Assistant: none    Indication: HF  AUC: 7  AUC indication: HF    Complication: None. Sedation: Intravenous Versed. Anesthesia: Xylocaine, fentanyl     Sedation time: I was present for sedation and ministration at 09 34 and I ended sedation at 1002 for a total face-to-face sedation time of 28 minutes. Estimated blood loss: Minimal    Specimens: none    Contrast used: 42cc    Hemodynamics:  Opening Aortic pressure: 92/17  LV systolic pressure: 96  LVEDP: 33  No significant gradient across the aortic valve    Angiographic Results/findings:  Left Main: Distal 50 to 60%  LAD: Proximal calcified 80 to 85% calcified 50% diffuse mid to distal diffuse 40%. Distal diffuse 30%. Small vessel. D1: Proximal to mid diffuse 80%. D2: Small vessel. No angiographically significant stenosis. Cx: Dominant vessel. Mild diffuse luminal irregularities. OM1: Mild diffuse luminal irregularities. OM2: Proximal to mid diffuse calcified 60 to 70%  OM 3: Small vessel. No angiographically significant stenosis. OM 4: (PDA): Ostial 95% subtotal occlusion. Mid 95% subtotal occlusion. Ramus: Proximal diffuse 60 to 70% stenosis. RCA: None dominant. Proximal diffuse 70%, proximal diffuse 80%. Procedure:   After obtaining informed consent the patient was taken to the cardiac Cath Lab where the area over the right radial artery was prepped and draped in a sterile fashion. Using ultrasound guidance and a micropuncture technique a 6 Thai slender glide sheath was placed in the right radial artery. This was aspirated & flushed several times throughout the procedure. This was medicated with verapamil and nitroglycerin. They were given heparin systemically. A 5 [place] TIG catheter was advanced over a wire to the root of the aorta. It was aspirated & flushed with saline. Pressures were obtained.   It was filled with contrast.  This was then manipulated into the left main coronary artery. 2 orthogonal views were obtained. A TIG catheter was then manipulated into the right coronary artery and 2 orthogonal views were then obtained. A 5 Irish L3.5 diagnostic catheter was then advanced and manipulated into the left main coronary artery and 4 more views were obtained. A 5 Irish angled pigtail was then advanced & manipulated into the left ventricle. This was then aspirated & flushed with saline & pressures were obtained. The catheter was then aspirated & flushed with saline once again & pull back pressures were then obtained across the aortic vlave. The right radial artery sheath was removed and a vasc band was then placed with good patent hemostasis. They tolerated the procedure well with no complications. Note: This report was completed using computerized voice recognition software. Every effort has been made to ensure accuracy, however; and invert and computerized transcription errors may be present.

## 2021-07-30 ENCOUNTER — HOSPITAL ENCOUNTER (EMERGENCY)
Age: 71
Discharge: HOME | End: 2021-07-30
Attending: EMERGENCY MEDICINE
Payer: MEDICARE

## 2021-07-30 ENCOUNTER — APPOINTMENT (OUTPATIENT)
Dept: ULTRASOUND IMAGING | Age: 71
End: 2021-07-30
Payer: MEDICARE

## 2021-07-30 ENCOUNTER — APPOINTMENT (OUTPATIENT)
Dept: GENERAL RADIOLOGY | Age: 71
End: 2021-07-30
Payer: MEDICARE

## 2021-07-30 VITALS
WEIGHT: 176 LBS | OXYGEN SATURATION: 99 % | TEMPERATURE: 97.4 F | RESPIRATION RATE: 16 BRPM | DIASTOLIC BLOOD PRESSURE: 68 MMHG | BODY MASS INDEX: 27.62 KG/M2 | HEART RATE: 87 BPM | SYSTOLIC BLOOD PRESSURE: 112 MMHG | HEIGHT: 67 IN

## 2021-07-30 DIAGNOSIS — R06.02 SHORTNESS OF BREATH: Primary | ICD-10-CM

## 2021-07-30 DIAGNOSIS — R79.89 ELEVATED LFTS: ICD-10-CM

## 2021-07-30 LAB
ALBUMIN SERPL-MCNC: 3.9 G/DL (ref 3.5–5.2)
ALP BLD-CCNC: 192 U/L (ref 40–129)
ALT SERPL-CCNC: 307 U/L (ref 0–40)
ANION GAP SERPL CALCULATED.3IONS-SCNC: 9 MMOL/L (ref 7–16)
ANISOCYTOSIS: ABNORMAL
AST SERPL-CCNC: 94 U/L (ref 0–39)
BASOPHILS ABSOLUTE: 0.03 E9/L (ref 0–0.2)
BASOPHILS RELATIVE PERCENT: 0.5 % (ref 0–2)
BILIRUB SERPL-MCNC: 1.8 MG/DL (ref 0–1.2)
BUN BLDV-MCNC: 42 MG/DL (ref 6–23)
BURR CELLS: ABNORMAL
CALCIUM SERPL-MCNC: 9.3 MG/DL (ref 8.6–10.2)
CHLORIDE BLD-SCNC: 99 MMOL/L (ref 98–107)
CO2: 25 MMOL/L (ref 22–29)
CREAT SERPL-MCNC: 1.3 MG/DL (ref 0.7–1.2)
EKG ATRIAL RATE: 82 BPM
EKG P AXIS: 48 DEGREES
EKG P-R INTERVAL: 160 MS
EKG Q-T INTERVAL: 428 MS
EKG QRS DURATION: 138 MS
EKG QTC CALCULATION (BAZETT): 500 MS
EKG R AXIS: -35 DEGREES
EKG T AXIS: 134 DEGREES
EKG VENTRICULAR RATE: 82 BPM
EOSINOPHILS ABSOLUTE: 0.06 E9/L (ref 0.05–0.5)
EOSINOPHILS RELATIVE PERCENT: 0.9 % (ref 0–6)
GFR AFRICAN AMERICAN: >60
GFR NON-AFRICAN AMERICAN: 54 ML/MIN/1.73
GLUCOSE BLD-MCNC: 151 MG/DL (ref 74–99)
HCT VFR BLD CALC: 43.5 % (ref 37–54)
HEMOGLOBIN: 14.3 G/DL (ref 12.5–16.5)
IMMATURE GRANULOCYTES #: 0.02 E9/L
IMMATURE GRANULOCYTES %: 0.3 % (ref 0–5)
LYMPHOCYTES ABSOLUTE: 0.56 E9/L (ref 1.5–4)
LYMPHOCYTES RELATIVE PERCENT: 8.6 % (ref 20–42)
MCH RBC QN AUTO: 29.9 PG (ref 26–35)
MCHC RBC AUTO-ENTMCNC: 32.9 % (ref 32–34.5)
MCV RBC AUTO: 91 FL (ref 80–99.9)
MONOCYTES ABSOLUTE: 0.53 E9/L (ref 0.1–0.95)
MONOCYTES RELATIVE PERCENT: 8.1 % (ref 2–12)
NEUTROPHILS ABSOLUTE: 5.31 E9/L (ref 1.8–7.3)
NEUTROPHILS RELATIVE PERCENT: 81.6 % (ref 43–80)
OVALOCYTES: ABNORMAL
PDW BLD-RTO: 14.8 FL (ref 11.5–15)
PLATELET # BLD: 186 E9/L (ref 130–450)
PMV BLD AUTO: 11.5 FL (ref 7–12)
POIKILOCYTES: ABNORMAL
POTASSIUM REFLEX MAGNESIUM: 4.3 MMOL/L (ref 3.5–5)
PRO-BNP: 4601 PG/ML (ref 0–125)
RBC # BLD: 4.78 E12/L (ref 3.8–5.8)
SODIUM BLD-SCNC: 133 MMOL/L (ref 132–146)
TOTAL PROTEIN: 6.3 G/DL (ref 6.4–8.3)
TROPONIN, HIGH SENSITIVITY: 29 NG/L (ref 0–11)
TROPONIN, HIGH SENSITIVITY: 32 NG/L (ref 0–11)
WBC # BLD: 6.5 E9/L (ref 4.5–11.5)

## 2021-07-30 PROCEDURE — 99284 EMERGENCY DEPT VISIT MOD MDM: CPT

## 2021-07-30 PROCEDURE — 76705 ECHO EXAM OF ABDOMEN: CPT

## 2021-07-30 PROCEDURE — 84484 ASSAY OF TROPONIN QUANT: CPT

## 2021-07-30 PROCEDURE — 85025 COMPLETE CBC W/AUTO DIFF WBC: CPT

## 2021-07-30 PROCEDURE — 80053 COMPREHEN METABOLIC PANEL: CPT

## 2021-07-30 PROCEDURE — 83880 ASSAY OF NATRIURETIC PEPTIDE: CPT

## 2021-07-30 PROCEDURE — 71045 X-RAY EXAM CHEST 1 VIEW: CPT

## 2021-07-30 PROCEDURE — 93005 ELECTROCARDIOGRAM TRACING: CPT | Performed by: STUDENT IN AN ORGANIZED HEALTH CARE EDUCATION/TRAINING PROGRAM

## 2021-07-30 ASSESSMENT — ENCOUNTER SYMPTOMS
WHEEZING: 0
SINUS PRESSURE: 0
SHORTNESS OF BREATH: 1
SORE THROAT: 0
EYE DISCHARGE: 0
VOMITING: 0
DIARRHEA: 0
COUGH: 0
EYE REDNESS: 0
ABDOMINAL PAIN: 0
NAUSEA: 0
EYE PAIN: 0
BACK PAIN: 0

## 2021-07-30 NOTE — ED PROVIDER NOTES
[facility]  Department of Emergency Medicine     Written by: Celia Perez DO  Patient Name: Brent Loera  Attending Provider: Sophie Kathleen MD  Admit Date: 2021 12:16 PM  MRN: [id number]                   : 1950        Chief Complaint   Patient presents with    Shortness of Breath     hx of chf    - Chief complaint    Patient is a 34-year-old male past medical history of CAD, heart failure reduced EF, hyperlipidemia and hypothyroidism. Patient with chief complaint of shortness of breath. Patient stated symptoms began yesterday. He notes symptoms have gradually worsened since onset. He states symptoms are somewhat worsened with exertion he denies any relieving factors. Patient notes that he has had multiple similar episodes in the past and is currently being evaluated at the [facility] clinic due to his poor cardiac function. Patient states that he has been compliant with all of his home medications. Patient denies any fevers, chills, nausea, vomiting, cough, abdominal pain, constipation or diarrhea. The history is provided by the patient. No  was used. Review of Systems   Constitutional: Negative for chills and fever. HENT: Negative for ear pain, sinus pressure and sore throat. Eyes: Negative for pain, discharge and redness. Respiratory: Positive for shortness of breath. Negative for cough and wheezing. Cardiovascular: Negative for chest pain and leg swelling. Gastrointestinal: Negative for abdominal pain, diarrhea, nausea and vomiting. Genitourinary: Negative for dysuria and frequency. Musculoskeletal: Negative for arthralgias and back pain. Skin: Negative for rash and wound. Neurological: Negative for weakness and headaches. Hematological: Negative for adenopathy. All other systems reviewed and are negative. Physical Exam  Vitals and nursing note reviewed.    Constitutional:       Appearance: He is rhythm with frequent PVCs no acute ischemic changes. Laboratory work obtained CBC demonstrate no acute abnormalities, CMP obtained demonstrated chronically elevated creatinine of 1.3 also mild elevated LFTs, bilirubin 1.8, alk phos 192, , AST 94, proBNP obtained is mildly elevated at 4601. Troponin obtained initially 29 on repeat 32. Chest x-ray obtained demonstrated small right-sided pleural effusion. Due to new onset elevated LFTs decision made to obtain right upper quadrant ultrasound. Son obtained demonstrated a limited evaluation of gallbladder no obvious cholelithiasis. Small amount of fluid noted next to the liver. Pulse ox tested well ambulating, patient did not drop blood 90% he was asymptomatic. Findings consistent with shortness of breath and elevated LFTs. Patient stable for discharge. Patient was instructed to follow-up with primary care doctor soon as possible. In addition patient was given follow-up with general surgery due to new onset elevated LFTs. Plan of care discussed with patient clearing discharge, all questions were answered, patient was in agreement plan of care and discharged home in stable condition. Amount and/or Complexity of Data Reviewed  Clinical lab tests: ordered and reviewed  Tests in the radiology section of CPT®: reviewed and ordered  Decide to obtain previous medical records or to obtain history from someone other than the patient: yes    Risk of Complications, Morbidity, and/or Mortality  Presenting problems: moderate  Diagnostic procedures: moderate  Management options: moderate    Patient Progress  Patient progress: stable             --------------------------------------------- PAST HISTORY ---------------------------------------------  Past Medical History:  has a past medical history of CAD (coronary artery disease), CHF (congestive heart failure) ([facility] [address] History of cardiovascular stress test, Hyperlipidemia, and Thyroid disease.     Past Surgical History:  has a past surgical history that includes fracture surgery and Cardiac catheterization (05/12/2021). Social History:  reports that he quit smoking about 32 years ago. He has a 20.00 pack-year smoking history. He has quit using smokeless tobacco. He reports previous alcohol use. He reports that he does not use drugs. Family History: family history is not on file. The patients home medications have been reviewed.     Allergies: Beta adrenergic blockers and Statins    -------------------------------------------------- RESULTS -------------------------------------------------  Labs:  Results for orders placed or performed during the hospital encounter of 07/30/21   CBC Auto Differential   Result Value Ref Range    WBC 6.5 4.5 - 11.5 E9/L    RBC 4.78 3.80 - 5.80 E12/L    Hemoglobin 14.3 12.5 - 16.5 g/dL    Hematocrit 43.5 37.0 - 54.0 %    MCV 91.0 80.0 - 99.9 fL    MCH 29.9 26.0 - 35.0 pg    MCHC 32.9 32.0 - 34.5 %    RDW 14.8 11.5 - 15.0 fL    Platelets 763 856 - 567 E9/L    MPV 11.5 7.0 - 12.0 fL    Neutrophils % 81.6 (H) 43.0 - 80.0 %    Immature Granulocytes % 0.3 0.0 - 5.0 %    Lymphocytes % 8.6 (L) 20.0 - 42.0 %    Monocytes % 8.1 2.0 - 12.0 %    Eosinophils % 0.9 0.0 - 6.0 %    Basophils % 0.5 0.0 - 2.0 %    Neutrophils Absolute 5.31 1.80 - 7.30 E9/L    Immature Granulocytes # 0.02 E9/L    Lymphocytes Absolute 0.56 (L) 1.50 - 4.00 E9/L    Monocytes Absolute 0.53 0.10 - 0.95 E9/L    Eosinophils Absolute 0.06 0.05 - 0.50 E9/L    Basophils Absolute 0.03 0.00 - 0.20 E9/L    Anisocytosis 1+     Poikilocytes 1+     Yvette Cells 2+     Ovalocytes 1+    Comprehensive Metabolic Panel w/ Reflex to MG   Result Value Ref Range    Sodium 133 132 - 146 mmol/L    Potassium reflex Magnesium 4.3 3.5 - 5.0 mmol/L    Chloride 99 98 - 107 mmol/L    CO2 25 22 - 29 mmol/L    Anion Gap 9 7 - 16 mmol/L    Glucose 151 (H) 74 - 99 mg/dL    BUN 42 (H) 6 - 23 mg/dL    CREATININE 1.3 (H) 0.7 - 1.2 mg/dL    GFR Non- American 54 >=60 mL/min/1.73    GFR African American >60     Calcium 9.3 8.6 - 10.2 mg/dL    Total Protein 6.3 (L) 6.4 - 8.3 g/dL    Albumin 3.9 3.5 - 5.2 g/dL    Total Bilirubin 1.8 (H) 0.0 - 1.2 mg/dL    Alkaline Phosphatase 192 (H) 40 - 129 U/L     (H) 0 - 40 U/L    AST 94 (H) 0 - 39 U/L   Troponin   Result Value Ref Range    Troponin, High Sensitivity 29 (H) 0 - 11 ng/L   Brain Natriuretic Peptide   Result Value Ref Range    Pro-BNP 4,601 (H) 0 - 125 pg/mL   Troponin   Result Value Ref Range    Troponin, High Sensitivity 32 (H) 0 - 11 ng/L   EKG 12 Lead   Result Value Ref Range    Ventricular Rate 82 BPM    Atrial Rate 82 BPM    P-R Interval 160 ms    QRS Duration 138 ms    Q-T Interval 428 ms    QTc Calculation (Bazett) 500 ms    P Axis 48 degrees    R Axis -35 degrees    T Axis 134 degrees       Radiology:  US GALLBLADDER RUQ   Final Result   Limited evaluation of the gallbladder due to gallbladder contraction. No   obvious cholelithiasis. No biliary ductal dilatation. Small amount of free fluid adjacent to the liver         XR CHEST PORTABLE   Final Result   Small right-sided pleural effusion.             ------------------------- NURSING NOTES AND VITALS REVIEWED ---------------------------  Date / Time Roomed:  7/30/2021 12:16 PM  ED Bed Assignment:  13/13    The nursing notes within the ED encounter and vital signs as below have been reviewed. /70   Pulse 86   Temp 97.4 °F (36.3 °C) (Oral)   Resp 16   Ht 5' 7\" (1.702 m)   Wt 176 lb (79.8 kg)   SpO2 99%   BMI 27.57 kg/m²   Oxygen Saturation Interpretation: Normal      ------------------------------------------ PROGRESS NOTES ------------------------------------------  4:13 PM EDT  I have spoken with the patient and discussed todays results, in addition to providing specific details for the plan of care and counseling regarding the diagnosis and prognosis.   Their questions are answered at this time and they are agreeable with the plan. I discussed at length with them reasons for immediate return here for re evaluation. They will followup with their primary care physician by calling their office on Monday.      --------------------------------- ADDITIONAL PROVIDER NOTES ---------------------------------  At this time the patient is without objective evidence of an acute process requiring hospitalization or inpatient management. They have remained hemodynamically stable throughout their entire ED visit and are stable for discharge with outpatient follow-up. The plan has been discussed in detail and they are aware of the specific conditions for emergent return, as well as the importance of follow-up. New Prescriptions    No medications on file       Diagnosis:  1. Shortness of breath    2. Elevated LFTs        Disposition:  Patient's disposition: Discharge to home  Patient's condition is stable. Patient was seen and evaluated by myself and my attending Viviana Rodriguez MD. Assessment and Plan discussed with attending provider, please see attestation for final plan of care.      DO Padmini Bosch DO  Resident  07/30/21 [id number]

## 2021-07-30 NOTE — CARE COORDINATION
Social Work / Discharge Planner:    Patient presented to the emergency department for shortness of breath with a history of CHF.  Patient declined CHF Clinic referral and stated he already receives care through [facility] and is established with a cardiologist. Electronically signed by СЕРГЕЙ Alejandra on 7/30/21 at 1:12 PM EDT

## 2021-07-30 NOTE — ED NOTES
Dr. Judith Blanton notified of pt ambulating in gray at 02 was 98%, pt had no complaints at this time     Hortencia Monteiro RN  07/30/21 1402

## 2021-11-09 ENCOUNTER — HOSPITAL ENCOUNTER (OUTPATIENT)
Dept: CARDIAC REHAB | Age: 71
Setting detail: THERAPIES SERIES
Discharge: HOME | End: 2021-11-09
Payer: OTHER GOVERNMENT

## 2021-11-09 VITALS
OXYGEN SATURATION: 99 % | DIASTOLIC BLOOD PRESSURE: 62 MMHG | BODY MASS INDEX: 26.37 KG/M2 | RESPIRATION RATE: 20 BRPM | SYSTOLIC BLOOD PRESSURE: 95 MMHG | HEIGHT: 68 IN | WEIGHT: 174 LBS | HEART RATE: 85 BPM

## 2021-11-09 RX ORDER — ALIROCUMAB 150 MG/ML
150 INJECTION, SOLUTION SUBCUTANEOUS
COMMUNITY

## 2021-11-09 RX ORDER — METOPROLOL SUCCINATE 25 MG/1
12.5 TABLET, EXTENDED RELEASE ORAL DAILY
COMMUNITY
End: 2022-02-24 | Stop reason: SINTOL

## 2021-11-09 ASSESSMENT — PATIENT HEALTH QUESTIONNAIRE - PHQ9
SUM OF ALL RESPONSES TO PHQ QUESTIONS 1-9: 0
SUM OF ALL RESPONSES TO PHQ QUESTIONS 1-9: 0

## 2021-11-11 ENCOUNTER — HOSPITAL ENCOUNTER (OUTPATIENT)
Dept: CARDIAC REHAB | Age: 71
Setting detail: THERAPIES SERIES
Discharge: HOME | End: 2021-11-11
Payer: OTHER GOVERNMENT

## 2021-11-11 PROCEDURE — 93798 PHYS/QHP OP CAR RHAB W/ECG: CPT

## 2021-11-12 ENCOUNTER — HOSPITAL ENCOUNTER (OUTPATIENT)
Dept: CARDIAC REHAB | Age: 71
Setting detail: THERAPIES SERIES
Discharge: HOME | End: 2021-11-12
Payer: OTHER GOVERNMENT

## 2021-11-12 PROCEDURE — 93798 PHYS/QHP OP CAR RHAB W/ECG: CPT

## 2021-11-15 ENCOUNTER — HOSPITAL ENCOUNTER (OUTPATIENT)
Dept: CARDIAC REHAB | Age: 71
Setting detail: THERAPIES SERIES
Discharge: HOME | End: 2021-11-15
Payer: OTHER GOVERNMENT

## 2021-11-15 PROCEDURE — 93798 PHYS/QHP OP CAR RHAB W/ECG: CPT

## 2021-11-17 ENCOUNTER — HOSPITAL ENCOUNTER (OUTPATIENT)
Dept: CARDIAC REHAB | Age: 71
Setting detail: THERAPIES SERIES
Discharge: HOME | End: 2021-11-17
Payer: OTHER GOVERNMENT

## 2021-11-17 PROCEDURE — 93798 PHYS/QHP OP CAR RHAB W/ECG: CPT

## 2021-11-19 ENCOUNTER — HOSPITAL ENCOUNTER (OUTPATIENT)
Dept: CARDIAC REHAB | Age: 71
Setting detail: THERAPIES SERIES
Discharge: HOME | End: 2021-11-19
Payer: OTHER GOVERNMENT

## 2021-11-19 PROCEDURE — 93798 PHYS/QHP OP CAR RHAB W/ECG: CPT

## 2021-11-22 ENCOUNTER — HOSPITAL ENCOUNTER (OUTPATIENT)
Dept: CARDIAC REHAB | Age: 71
Setting detail: THERAPIES SERIES
Discharge: HOME | End: 2021-11-22
Payer: OTHER GOVERNMENT

## 2021-11-22 PROCEDURE — 93798 PHYS/QHP OP CAR RHAB W/ECG: CPT

## 2021-11-24 ENCOUNTER — HOSPITAL ENCOUNTER (OUTPATIENT)
Dept: CARDIAC REHAB | Age: 71
Setting detail: THERAPIES SERIES
Discharge: HOME | End: 2021-11-24
Payer: OTHER GOVERNMENT

## 2021-11-24 PROCEDURE — 93798 PHYS/QHP OP CAR RHAB W/ECG: CPT

## 2021-11-26 ENCOUNTER — HOSPITAL ENCOUNTER (OUTPATIENT)
Dept: CARDIAC REHAB | Age: 71
Setting detail: THERAPIES SERIES
Discharge: HOME | End: 2021-11-26
Payer: OTHER GOVERNMENT

## 2021-11-26 PROCEDURE — 93798 PHYS/QHP OP CAR RHAB W/ECG: CPT

## 2021-11-29 ENCOUNTER — HOSPITAL ENCOUNTER (OUTPATIENT)
Dept: CARDIAC REHAB | Age: 71
Setting detail: THERAPIES SERIES
Discharge: HOME | End: 2021-11-29
Payer: OTHER GOVERNMENT

## 2021-11-29 PROCEDURE — 93798 PHYS/QHP OP CAR RHAB W/ECG: CPT

## 2021-12-01 ENCOUNTER — HOSPITAL ENCOUNTER (OUTPATIENT)
Dept: CARDIAC REHAB | Age: 71
Setting detail: THERAPIES SERIES
Discharge: HOME | End: 2021-12-01
Payer: OTHER GOVERNMENT

## 2021-12-01 PROCEDURE — 93798 PHYS/QHP OP CAR RHAB W/ECG: CPT

## 2021-12-03 ENCOUNTER — HOSPITAL ENCOUNTER (OUTPATIENT)
Dept: CARDIAC REHAB | Age: 71
Setting detail: THERAPIES SERIES
Discharge: HOME | End: 2021-12-03
Payer: OTHER GOVERNMENT

## 2021-12-03 PROCEDURE — 93798 PHYS/QHP OP CAR RHAB W/ECG: CPT

## 2021-12-06 ENCOUNTER — HOSPITAL ENCOUNTER (OUTPATIENT)
Dept: CARDIAC REHAB | Age: 71
Setting detail: THERAPIES SERIES
Discharge: HOME | End: 2021-12-06
Payer: OTHER GOVERNMENT

## 2021-12-06 PROCEDURE — 93798 PHYS/QHP OP CAR RHAB W/ECG: CPT

## 2021-12-08 ENCOUNTER — HOSPITAL ENCOUNTER (OUTPATIENT)
Dept: CARDIAC REHAB | Age: 71
Setting detail: THERAPIES SERIES
End: 2021-12-08
Payer: OTHER GOVERNMENT

## 2021-12-10 ENCOUNTER — HOSPITAL ENCOUNTER (OUTPATIENT)
Dept: CARDIAC REHAB | Age: 71
Setting detail: THERAPIES SERIES
Discharge: HOME | End: 2021-12-10
Payer: OTHER GOVERNMENT

## 2021-12-10 PROCEDURE — 93798 PHYS/QHP OP CAR RHAB W/ECG: CPT

## 2021-12-13 ENCOUNTER — HOSPITAL ENCOUNTER (OUTPATIENT)
Dept: CARDIAC REHAB | Age: 71
Setting detail: THERAPIES SERIES
End: 2021-12-13
Payer: OTHER GOVERNMENT

## 2022-01-20 LAB
ANION GAP SERPL CALCULATED.3IONS-SCNC: 11 MMOL/L (ref 7–16)
BUN BLDV-MCNC: 22 MG/DL (ref 6–23)
CALCIUM SERPL-MCNC: 9.8 MG/DL (ref 8.6–10.2)
CHLORIDE BLD-SCNC: 97 MMOL/L (ref 98–107)
CO2: 26 MMOL/L (ref 22–29)
CREAT SERPL-MCNC: 1 MG/DL (ref 0.7–1.2)
GFR AFRICAN AMERICAN: >60
GFR NON-AFRICAN AMERICAN: >60 ML/MIN/1.73
GLUCOSE BLD-MCNC: 124 MG/DL (ref 74–99)
HCT VFR BLD CALC: 35.9 % (ref 37–54)
HEMOGLOBIN: 11.6 G/DL (ref 12.5–16.5)
MCH RBC QN AUTO: 29.4 PG (ref 26–35)
MCHC RBC AUTO-ENTMCNC: 32.3 % (ref 32–34.5)
MCV RBC AUTO: 90.9 FL (ref 80–99.9)
PDW BLD-RTO: 17.2 FL (ref 11.5–15)
PLATELET # BLD: 207 E9/L (ref 130–450)
PMV BLD AUTO: 10.5 FL (ref 7–12)
POTASSIUM SERPL-SCNC: 3.8 MMOL/L (ref 3.5–5)
RBC # BLD: 3.95 E12/L (ref 3.8–5.8)
SODIUM BLD-SCNC: 134 MMOL/L (ref 132–146)
WBC # BLD: 7.6 E9/L (ref 4.5–11.5)

## 2022-01-26 ENCOUNTER — CLINICAL DOCUMENTATION (OUTPATIENT)
Dept: CARDIAC REHAB | Age: 72
End: 2022-01-26

## 2022-01-26 NOTE — PROGRESS NOTES
NAME has completed 13/36 telemetry monitored exercise sessions. Pt called in on 1/21/22 and had a heart transplant on 12/26/21. He has a referral from cardiologist to start cardiac rehab, but he doesn't know when he is allowed and he will ask when he goes for his next weekly appointment and call us back. He will start over at session one with the new referral.  We are discharging him at this time. We would be happy to accommodate him if he would like to return to Cardiac Rehab. Upon request, adetailed summary of his sessions can be sent for your review. Please call [facility] at [phone] with any questions or concerns. Thank you for allowing us to care for your patient.

## 2022-01-31 LAB
ANION GAP SERPL CALCULATED.3IONS-SCNC: 9 MMOL/L (ref 7–16)
BUN BLDV-MCNC: 24 MG/DL (ref 6–23)
CALCIUM SERPL-MCNC: 9.8 MG/DL (ref 8.6–10.2)
CHLORIDE BLD-SCNC: 102 MMOL/L (ref 98–107)
CO2: 28 MMOL/L (ref 22–29)
CREAT SERPL-MCNC: 0.9 MG/DL (ref 0.7–1.2)
GFR AFRICAN AMERICAN: >60
GFR NON-AFRICAN AMERICAN: >60 ML/MIN/1.73
GLUCOSE BLD-MCNC: 163 MG/DL (ref 74–99)
HCT VFR BLD CALC: 38.1 % (ref 37–54)
HEMOGLOBIN: 12 G/DL (ref 12.5–16.5)
MCH RBC QN AUTO: 28.8 PG (ref 26–35)
MCHC RBC AUTO-ENTMCNC: 31.5 % (ref 32–34.5)
MCV RBC AUTO: 91.6 FL (ref 80–99.9)
PDW BLD-RTO: 16.3 FL (ref 11.5–15)
PLATELET # BLD: 220 E9/L (ref 130–450)
PMV BLD AUTO: 10.6 FL (ref 7–12)
POTASSIUM SERPL-SCNC: 3.8 MMOL/L (ref 3.5–5)
RBC # BLD: 4.16 E12/L (ref 3.8–5.8)
SODIUM BLD-SCNC: 139 MMOL/L (ref 132–146)
WBC # BLD: 9 E9/L (ref 4.5–11.5)

## 2022-02-07 LAB
SARS-COV-2: NOT DETECTED
SOURCE: NORMAL

## 2022-02-16 LAB
ANION GAP SERPL CALCULATED.3IONS-SCNC: 10 MMOL/L (ref 7–16)
BUN BLDV-MCNC: 22 MG/DL (ref 6–23)
CALCIUM SERPL-MCNC: 9.1 MG/DL (ref 8.6–10.2)
CHLORIDE BLD-SCNC: 101 MMOL/L (ref 98–107)
CO2: 24 MMOL/L (ref 22–29)
CREAT SERPL-MCNC: 0.9 MG/DL (ref 0.7–1.2)
GFR AFRICAN AMERICAN: >60
GFR NON-AFRICAN AMERICAN: >60 ML/MIN/1.73
GLUCOSE BLD-MCNC: 142 MG/DL (ref 74–99)
HCT VFR BLD CALC: 39.1 % (ref 37–54)
HEMOGLOBIN: 12.5 G/DL (ref 12.5–16.5)
MCH RBC QN AUTO: 28.5 PG (ref 26–35)
MCHC RBC AUTO-ENTMCNC: 32 % (ref 32–34.5)
MCV RBC AUTO: 89.3 FL (ref 80–99.9)
PDW BLD-RTO: 15.2 FL (ref 11.5–15)
PLATELET # BLD: 156 E9/L (ref 130–450)
PMV BLD AUTO: 10.8 FL (ref 7–12)
POTASSIUM SERPL-SCNC: 4.1 MMOL/L (ref 3.5–5)
RBC # BLD: 4.38 E12/L (ref 3.8–5.8)
SODIUM BLD-SCNC: 135 MMOL/L (ref 132–146)
WBC # BLD: 7.7 E9/L (ref 4.5–11.5)

## 2022-02-24 ENCOUNTER — HOSPITAL ENCOUNTER (OUTPATIENT)
Dept: CARDIAC REHAB | Age: 72
Setting detail: THERAPIES SERIES
Discharge: HOME | End: 2022-02-24

## 2022-02-24 VITALS
DIASTOLIC BLOOD PRESSURE: 90 MMHG | RESPIRATION RATE: 24 BRPM | WEIGHT: 163.8 LBS | OXYGEN SATURATION: 97 % | HEIGHT: 68 IN | SYSTOLIC BLOOD PRESSURE: 136 MMHG | BODY MASS INDEX: 24.83 KG/M2 | HEART RATE: 100 BPM

## 2022-02-24 RX ORDER — PANTOPRAZOLE SODIUM 20 MG/1
20 TABLET, DELAYED RELEASE ORAL DAILY
COMMUNITY
Start: 2022-01-03

## 2022-02-24 RX ORDER — MAGNESIUM OXIDE 400 MG/1
800 TABLET ORAL 2 TIMES DAILY
COMMUNITY
Start: 2022-01-05

## 2022-02-24 RX ORDER — GLECAPREVIR AND PIBRENTASVIR 40; 100 MG/1; MG/1
3 TABLET, FILM COATED ORAL DAILY
COMMUNITY
Start: 2022-01-08 | End: 2022-03-28

## 2022-02-24 RX ORDER — TACROLIMUS 1 MG/1
1 CAPSULE ORAL 2 TIMES DAILY
COMMUNITY
Start: 2022-01-04

## 2022-02-24 RX ORDER — PREDNISONE 20 MG/1
20 TABLET ORAL DAILY
COMMUNITY

## 2022-02-24 RX ORDER — SULFAMETHOXAZOLE AND TRIMETHOPRIM 800; 160 MG/1; MG/1
1 TABLET ORAL
COMMUNITY
Start: 2022-01-03

## 2022-02-24 RX ORDER — TACROLIMUS 0.5 MG/1
0.5 CAPSULE ORAL 2 TIMES DAILY
COMMUNITY
Start: 2022-02-22

## 2022-02-24 RX ORDER — ALENDRONATE SODIUM 70 MG/1
70 TABLET ORAL
COMMUNITY

## 2022-02-24 RX ORDER — MYCOPHENOLATE MOFETIL 250 MG/1
250 CAPSULE ORAL 2 TIMES DAILY
COMMUNITY
Start: 2022-01-03

## 2022-02-24 RX ORDER — LANOLIN ALCOHOL/MO/W.PET/CERES
1 CREAM (GRAM) TOPICAL 2 TIMES DAILY
COMMUNITY
Start: 2022-02-04

## 2022-02-24 ASSESSMENT — PATIENT HEALTH QUESTIONNAIRE - PHQ9
SUM OF ALL RESPONSES TO PHQ QUESTIONS 1-9: 0
2. FEELING DOWN, DEPRESSED OR HOPELESS: 0
1. LITTLE INTEREST OR PLEASURE IN DOING THINGS: 0
SUM OF ALL RESPONSES TO PHQ9 QUESTIONS 1 & 2: 0
SUM OF ALL RESPONSES TO PHQ9 QUESTIONS 1 & 2: 0
SUM OF ALL RESPONSES TO PHQ QUESTIONS 1-9: 0
1. LITTLE INTEREST OR PLEASURE IN DOING THINGS: 0
SUM OF ALL RESPONSES TO PHQ QUESTIONS 1-9: 0
2. FEELING DOWN, DEPRESSED OR HOPELESS: 0

## 2022-02-24 ASSESSMENT — EJECTION FRACTION: EF_VALUE: 56

## 2022-02-24 NOTE — CARDIO/PULMONARY
Patient attended cardiac rehab here up until November of 2021 for CHF. He then had a heart transplant on 12/26/2021 in [place]. Sternal incision is RUBY and well-approximated with no drainage noted. No edema noted; pt. States no SOB or edema since surgery. He denies any signs and symptoms of depression; says he feels better after his transplant. Has some right ankle arthritis and an old back fracture but no other orthopedic issues. He is a retired . He has been doing some walking at home and is familiar with working with the treadmill, recumbent bike, Nu-step and arm ergometer here.

## 2022-03-02 ENCOUNTER — HOSPITAL ENCOUNTER (OUTPATIENT)
Dept: CARDIAC REHAB | Age: 72
Setting detail: THERAPIES SERIES
Discharge: HOME | End: 2022-03-02
Payer: OTHER GOVERNMENT

## 2022-03-02 PROCEDURE — 93798 PHYS/QHP OP CAR RHAB W/ECG: CPT

## 2022-03-04 ENCOUNTER — HOSPITAL ENCOUNTER (OUTPATIENT)
Dept: CARDIAC REHAB | Age: 72
Setting detail: THERAPIES SERIES
Discharge: HOME | End: 2022-03-04
Payer: OTHER GOVERNMENT

## 2022-03-04 PROCEDURE — 93798 PHYS/QHP OP CAR RHAB W/ECG: CPT

## 2022-03-07 ENCOUNTER — HOSPITAL ENCOUNTER (OUTPATIENT)
Dept: CARDIAC REHAB | Age: 72
Setting detail: THERAPIES SERIES
Discharge: HOME | End: 2022-03-07
Payer: OTHER GOVERNMENT

## 2022-03-07 PROCEDURE — 93798 PHYS/QHP OP CAR RHAB W/ECG: CPT

## 2022-03-09 ENCOUNTER — HOSPITAL ENCOUNTER (OUTPATIENT)
Dept: CARDIAC REHAB | Age: 72
Setting detail: THERAPIES SERIES
Discharge: HOME | End: 2022-03-09
Payer: OTHER GOVERNMENT

## 2022-03-09 PROCEDURE — 93798 PHYS/QHP OP CAR RHAB W/ECG: CPT

## 2022-03-11 ENCOUNTER — HOSPITAL ENCOUNTER (OUTPATIENT)
Dept: CARDIAC REHAB | Age: 72
Setting detail: THERAPIES SERIES
Discharge: HOME | End: 2022-03-11
Payer: OTHER GOVERNMENT

## 2022-03-11 PROCEDURE — 93798 PHYS/QHP OP CAR RHAB W/ECG: CPT

## 2022-03-14 ENCOUNTER — HOSPITAL ENCOUNTER (OUTPATIENT)
Dept: CARDIAC REHAB | Age: 72
Setting detail: THERAPIES SERIES
Discharge: HOME | End: 2022-03-14
Payer: OTHER GOVERNMENT

## 2022-03-14 PROCEDURE — 93798 PHYS/QHP OP CAR RHAB W/ECG: CPT

## 2022-03-16 ENCOUNTER — HOSPITAL ENCOUNTER (OUTPATIENT)
Dept: CARDIAC REHAB | Age: 72
Setting detail: THERAPIES SERIES
Discharge: HOME | End: 2022-03-16
Payer: OTHER GOVERNMENT

## 2022-03-16 PROCEDURE — 93798 PHYS/QHP OP CAR RHAB W/ECG: CPT

## 2022-03-18 ENCOUNTER — HOSPITAL ENCOUNTER (OUTPATIENT)
Dept: CARDIAC REHAB | Age: 72
Setting detail: THERAPIES SERIES
Discharge: HOME | End: 2022-03-18
Payer: OTHER GOVERNMENT

## 2022-03-18 PROCEDURE — 93798 PHYS/QHP OP CAR RHAB W/ECG: CPT

## 2022-03-21 ENCOUNTER — HOSPITAL ENCOUNTER (OUTPATIENT)
Dept: CARDIAC REHAB | Age: 72
Setting detail: THERAPIES SERIES
Discharge: HOME | End: 2022-03-21
Payer: OTHER GOVERNMENT

## 2022-03-21 PROCEDURE — 93798 PHYS/QHP OP CAR RHAB W/ECG: CPT

## 2022-03-23 ENCOUNTER — HOSPITAL ENCOUNTER (OUTPATIENT)
Dept: CARDIAC REHAB | Age: 72
Setting detail: THERAPIES SERIES
Discharge: HOME | End: 2022-03-23
Payer: OTHER GOVERNMENT

## 2022-03-23 PROCEDURE — 93798 PHYS/QHP OP CAR RHAB W/ECG: CPT

## 2022-03-25 ENCOUNTER — HOSPITAL ENCOUNTER (OUTPATIENT)
Dept: CARDIAC REHAB | Age: 72
Setting detail: THERAPIES SERIES
Discharge: HOME | End: 2022-03-25
Payer: OTHER GOVERNMENT

## 2022-03-25 PROCEDURE — 93798 PHYS/QHP OP CAR RHAB W/ECG: CPT

## 2022-03-28 ENCOUNTER — HOSPITAL ENCOUNTER (OUTPATIENT)
Dept: CARDIAC REHAB | Age: 72
Setting detail: THERAPIES SERIES
Discharge: HOME | End: 2022-03-28
Payer: OTHER GOVERNMENT

## 2022-03-28 PROCEDURE — 93798 PHYS/QHP OP CAR RHAB W/ECG: CPT

## 2022-03-30 ENCOUNTER — HOSPITAL ENCOUNTER (OUTPATIENT)
Dept: CARDIAC REHAB | Age: 72
Setting detail: THERAPIES SERIES
Discharge: HOME | End: 2022-03-30
Payer: OTHER GOVERNMENT

## 2022-03-30 PROCEDURE — 93798 PHYS/QHP OP CAR RHAB W/ECG: CPT

## 2022-04-01 ENCOUNTER — HOSPITAL ENCOUNTER (OUTPATIENT)
Dept: CARDIAC REHAB | Age: 72
Setting detail: THERAPIES SERIES
Discharge: HOME | End: 2022-04-01
Payer: OTHER GOVERNMENT

## 2022-04-01 PROCEDURE — 93798 PHYS/QHP OP CAR RHAB W/ECG: CPT

## 2022-04-04 ENCOUNTER — HOSPITAL ENCOUNTER (OUTPATIENT)
Dept: CARDIAC REHAB | Age: 72
Setting detail: THERAPIES SERIES
Discharge: HOME | End: 2022-04-04
Payer: OTHER GOVERNMENT

## 2022-04-04 PROCEDURE — 93798 PHYS/QHP OP CAR RHAB W/ECG: CPT

## 2022-04-06 ENCOUNTER — HOSPITAL ENCOUNTER (OUTPATIENT)
Dept: CARDIAC REHAB | Age: 72
Setting detail: THERAPIES SERIES
End: 2022-04-06
Payer: OTHER GOVERNMENT

## 2022-04-07 ENCOUNTER — HOSPITAL ENCOUNTER (OUTPATIENT)
Dept: CARDIAC REHAB | Age: 72
Setting detail: THERAPIES SERIES
Discharge: HOME | End: 2022-04-07
Payer: OTHER GOVERNMENT

## 2022-04-07 PROCEDURE — 93798 PHYS/QHP OP CAR RHAB W/ECG: CPT

## 2022-04-08 ENCOUNTER — HOSPITAL ENCOUNTER (OUTPATIENT)
Dept: CARDIAC REHAB | Age: 72
Setting detail: THERAPIES SERIES
Discharge: HOME | End: 2022-04-08
Payer: OTHER GOVERNMENT

## 2022-04-08 PROCEDURE — 93798 PHYS/QHP OP CAR RHAB W/ECG: CPT

## 2022-04-11 ENCOUNTER — HOSPITAL ENCOUNTER (OUTPATIENT)
Dept: CARDIAC REHAB | Age: 72
Setting detail: THERAPIES SERIES
Discharge: HOME | End: 2022-04-11
Payer: OTHER GOVERNMENT

## 2022-04-11 PROCEDURE — 93798 PHYS/QHP OP CAR RHAB W/ECG: CPT

## 2022-04-13 ENCOUNTER — HOSPITAL ENCOUNTER (OUTPATIENT)
Dept: CARDIAC REHAB | Age: 72
Setting detail: THERAPIES SERIES
Discharge: HOME | End: 2022-04-13
Payer: OTHER GOVERNMENT

## 2022-04-13 PROCEDURE — 93798 PHYS/QHP OP CAR RHAB W/ECG: CPT

## 2022-04-15 ENCOUNTER — HOSPITAL ENCOUNTER (OUTPATIENT)
Dept: CARDIAC REHAB | Age: 72
Setting detail: THERAPIES SERIES
Discharge: HOME | End: 2022-04-15
Payer: OTHER GOVERNMENT

## 2022-04-15 PROCEDURE — 93798 PHYS/QHP OP CAR RHAB W/ECG: CPT

## 2022-04-18 ENCOUNTER — HOSPITAL ENCOUNTER (OUTPATIENT)
Dept: CARDIAC REHAB | Age: 72
Setting detail: THERAPIES SERIES
Discharge: HOME | End: 2022-04-18
Payer: OTHER GOVERNMENT

## 2022-04-18 PROCEDURE — 93798 PHYS/QHP OP CAR RHAB W/ECG: CPT

## 2022-04-20 ENCOUNTER — APPOINTMENT (OUTPATIENT)
Dept: CARDIAC REHAB | Age: 72
End: 2022-04-20
Payer: OTHER GOVERNMENT

## 2022-04-22 ENCOUNTER — HOSPITAL ENCOUNTER (OUTPATIENT)
Dept: CARDIAC REHAB | Age: 72
Setting detail: THERAPIES SERIES
Discharge: HOME | End: 2022-04-22
Payer: OTHER GOVERNMENT

## 2022-04-22 PROCEDURE — 93798 PHYS/QHP OP CAR RHAB W/ECG: CPT

## 2022-04-25 ENCOUNTER — HOSPITAL ENCOUNTER (OUTPATIENT)
Dept: CARDIAC REHAB | Age: 72
Setting detail: THERAPIES SERIES
Discharge: HOME | End: 2022-04-25
Payer: OTHER GOVERNMENT

## 2022-04-25 PROCEDURE — 93798 PHYS/QHP OP CAR RHAB W/ECG: CPT

## 2022-04-27 ENCOUNTER — HOSPITAL ENCOUNTER (OUTPATIENT)
Dept: CARDIAC REHAB | Age: 72
Setting detail: THERAPIES SERIES
Discharge: HOME | End: 2022-04-27
Payer: OTHER GOVERNMENT

## 2022-04-27 PROCEDURE — 93798 PHYS/QHP OP CAR RHAB W/ECG: CPT

## 2022-04-29 ENCOUNTER — HOSPITAL ENCOUNTER (OUTPATIENT)
Dept: CARDIAC REHAB | Age: 72
Setting detail: THERAPIES SERIES
Discharge: HOME | End: 2022-04-29
Payer: OTHER GOVERNMENT

## 2022-04-29 PROCEDURE — 93798 PHYS/QHP OP CAR RHAB W/ECG: CPT

## 2022-05-02 ENCOUNTER — HOSPITAL ENCOUNTER (OUTPATIENT)
Dept: CARDIAC REHAB | Age: 72
Setting detail: THERAPIES SERIES
Discharge: HOME | End: 2022-05-02
Payer: OTHER GOVERNMENT

## 2022-05-02 PROCEDURE — 93798 PHYS/QHP OP CAR RHAB W/ECG: CPT

## 2022-05-04 ENCOUNTER — HOSPITAL ENCOUNTER (OUTPATIENT)
Dept: CARDIAC REHAB | Age: 72
Setting detail: THERAPIES SERIES
Discharge: HOME | End: 2022-05-04
Payer: OTHER GOVERNMENT

## 2022-05-04 PROCEDURE — 93798 PHYS/QHP OP CAR RHAB W/ECG: CPT

## 2022-05-06 ENCOUNTER — HOSPITAL ENCOUNTER (OUTPATIENT)
Dept: CARDIAC REHAB | Age: 72
Setting detail: THERAPIES SERIES
Discharge: HOME | End: 2022-05-06
Payer: OTHER GOVERNMENT

## 2022-05-06 PROCEDURE — 93798 PHYS/QHP OP CAR RHAB W/ECG: CPT

## 2022-05-09 ENCOUNTER — HOSPITAL ENCOUNTER (OUTPATIENT)
Dept: CARDIAC REHAB | Age: 72
Setting detail: THERAPIES SERIES
Discharge: HOME | End: 2022-05-09
Payer: OTHER GOVERNMENT

## 2022-05-09 PROCEDURE — 93798 PHYS/QHP OP CAR RHAB W/ECG: CPT

## 2022-05-11 ENCOUNTER — HOSPITAL ENCOUNTER (OUTPATIENT)
Dept: CARDIAC REHAB | Age: 72
Setting detail: THERAPIES SERIES
Discharge: HOME | End: 2022-05-11
Payer: OTHER GOVERNMENT

## 2022-05-11 PROCEDURE — 93798 PHYS/QHP OP CAR RHAB W/ECG: CPT

## 2022-05-13 ENCOUNTER — HOSPITAL ENCOUNTER (OUTPATIENT)
Dept: CARDIAC REHAB | Age: 72
Setting detail: THERAPIES SERIES
Discharge: HOME | End: 2022-05-13
Payer: OTHER GOVERNMENT

## 2022-05-13 PROCEDURE — 93798 PHYS/QHP OP CAR RHAB W/ECG: CPT

## 2022-05-16 ENCOUNTER — HOSPITAL ENCOUNTER (OUTPATIENT)
Dept: CARDIAC REHAB | Age: 72
Setting detail: THERAPIES SERIES
Discharge: HOME | End: 2022-05-16
Payer: OTHER GOVERNMENT

## 2022-05-16 PROCEDURE — 93798 PHYS/QHP OP CAR RHAB W/ECG: CPT

## 2022-05-18 ENCOUNTER — HOSPITAL ENCOUNTER (OUTPATIENT)
Dept: CARDIAC REHAB | Age: 72
Setting detail: THERAPIES SERIES
Discharge: HOME | End: 2022-05-18
Payer: OTHER GOVERNMENT

## 2022-05-18 PROCEDURE — 93798 PHYS/QHP OP CAR RHAB W/ECG: CPT

## 2022-05-20 ENCOUNTER — HOSPITAL ENCOUNTER (OUTPATIENT)
Dept: CARDIAC REHAB | Age: 72
Setting detail: THERAPIES SERIES
Discharge: HOME | End: 2022-05-20
Payer: OTHER GOVERNMENT

## 2022-05-20 PROCEDURE — 93798 PHYS/QHP OP CAR RHAB W/ECG: CPT

## 2022-05-23 ENCOUNTER — HOSPITAL ENCOUNTER (OUTPATIENT)
Dept: CARDIAC REHAB | Age: 72
Setting detail: THERAPIES SERIES
Discharge: HOME | End: 2022-05-23
Payer: OTHER GOVERNMENT

## 2022-05-23 PROCEDURE — 93798 PHYS/QHP OP CAR RHAB W/ECG: CPT

## 2022-05-25 ENCOUNTER — HOSPITAL ENCOUNTER (OUTPATIENT)
Dept: CARDIAC REHAB | Age: 72
Setting detail: THERAPIES SERIES
Discharge: HOME | End: 2022-05-25
Payer: OTHER GOVERNMENT

## 2022-05-25 PROCEDURE — 93798 PHYS/QHP OP CAR RHAB W/ECG: CPT

## 2022-05-25 NOTE — DISCHARGE SUMMARY
Paul Kuo has completed 36/36 telemetry monitored exercise sessions. Using a variety of cardiovascular equipment, he is able to sustain an average of 60 minutes, at  2.7-4.3 MET level of intensity, with proper hemodynamic response. He has gained 5 pounds since beginning Cardiac Rehab, currently weighing 173.4lbs. He has been asymptomatic during his exercise sessions. A home exercise program handout was given and reviewed. He plans to continue exercise on his own. Upon request, a detailed summary of his session readings can be sent for your review. Please call [facility] at [phone]. Thank you for allowing us to care for your patient.

## 2022-05-27 ENCOUNTER — APPOINTMENT (OUTPATIENT)
Dept: CARDIAC REHAB | Age: 72
End: 2022-05-27
Payer: OTHER GOVERNMENT

## 2022-11-26 NOTE — PROGRESS NOTES
[facility] [address]    Name: Bebeto Cowan    Age: 69 y.o. PCP: Rupert Greenfield MD    Date of Admission: 12/7/2020 11:24 PM    Date of Service: 12/12/2020    Chief Complaint: Follow-up for Acute on chronic systolic CHF LVEF 65%, chronic CAD, mitral valve and tricuspid valve regurgitation severe pulmonary hypertension.       Interim History:  No new overnight cardiac complaints. Currently with no complaints of CP, SOB, palpitations, dizziness, or lightheadedness. Ambulated 4X around the nurses station, no lightheadedness. BPs appears reasonably stable. Telemetry personally reviewed and showed sinus rhythm      Review of Systems:   Cardiac: As per HPI  General: No fever, chills  Pulmonary: No cough, wheeze, or shortness of breath  GI: No nausea, vomiting,or abdominal pain  Neuro: No headache or confusion    Problem List:  Active Problems:    CHF (congestive heart failure), NYHA class I, acute on chronic, combined ([facility] [address]  Resolved Problems:    * No resolved hospital problems. *      Past Medical History:  Past Medical History:   Diagnosis Date    CAD (coronary artery disease) 1994    ATHERECTOMY    CHF (congestive heart failure) ([facility] [address]     History of cardiovascular stress test 9/13/2012    STRESS ECHO    Hyperlipidemia     Thyroid disease        Allergies:   Allergies   Allergen Reactions    Beta Adrenergic Blockers Other (See Comments)     Disorientation, blood pressure increased      Statins Other (See Comments)     Cramping         Current Medications:  Current Facility-Administered Medications   Medication Dose Route Frequency Provider Last Rate Last Admin    bumetanide (BUMEX) tablet 1 mg  1 mg Oral BID Aisha Damon MD   1 mg at 12/12/20 5617    benzonatate (TESSALON) capsule 200 mg  200 mg Oral TID PRN Isidro Farmer MD   200 mg at 12/12/20 8316    Vitamin D (CHOLECALCIFEROL) tablet 1,000 Units  1,000 Units Oral Daily Isidro Farmer MD   1,000 Units soft

## 2025-06-23 ENCOUNTER — HOSPITAL ENCOUNTER (EMERGENCY)
Age: 75
Discharge: HOME | End: 2025-06-23
Payer: OTHER GOVERNMENT

## 2025-06-23 ENCOUNTER — APPOINTMENT (OUTPATIENT)
Dept: CT IMAGING | Age: 75
End: 2025-06-23
Payer: OTHER GOVERNMENT

## 2025-06-23 VITALS
HEART RATE: 98 BPM | WEIGHT: 179 LBS | RESPIRATION RATE: 16 BRPM | SYSTOLIC BLOOD PRESSURE: 160 MMHG | DIASTOLIC BLOOD PRESSURE: 110 MMHG | HEIGHT: 68 IN | BODY MASS INDEX: 27.13 KG/M2 | TEMPERATURE: 98.2 F | OXYGEN SATURATION: 97 %

## 2025-06-23 DIAGNOSIS — R10.9 RIGHT FLANK PAIN: Primary | ICD-10-CM

## 2025-06-23 DIAGNOSIS — R31.9 HEMATURIA, UNSPECIFIED TYPE: ICD-10-CM

## 2025-06-23 DIAGNOSIS — N20.1 URETEROLITHIASIS: ICD-10-CM

## 2025-06-23 LAB
ALBUMIN SERPL-MCNC: 5.1 G/DL (ref 3.5–5.2)
ALP SERPL-CCNC: 49 U/L (ref 40–129)
ALT SERPL-CCNC: 15 U/L (ref 0–50)
ANION GAP SERPL CALCULATED.3IONS-SCNC: 16 MMOL/L (ref 7–16)
AST SERPL-CCNC: 20 U/L (ref 0–50)
BASOPHILS # BLD: 0.01 K/UL (ref 0–0.2)
BASOPHILS NFR BLD: 0 % (ref 0–2)
BILIRUB SERPL-MCNC: 0.9 MG/DL (ref 0–1.2)
BILIRUB UR QL STRIP: NEGATIVE
BUN SERPL-MCNC: 27 MG/DL (ref 8–23)
CALCIUM SERPL-MCNC: 9.8 MG/DL (ref 8.8–10.2)
CHLORIDE SERPL-SCNC: 100 MMOL/L (ref 98–107)
CLARITY UR: CLEAR
CO2 SERPL-SCNC: 22 MMOL/L (ref 22–29)
COLOR UR: YELLOW
CREAT SERPL-MCNC: 1.3 MG/DL (ref 0.7–1.2)
EOSINOPHIL # BLD: 0 K/UL (ref 0.05–0.5)
EOSINOPHILS RELATIVE PERCENT: 0 % (ref 0–6)
ERYTHROCYTE [DISTWIDTH] IN BLOOD BY AUTOMATED COUNT: 13.2 % (ref 11.5–15)
GFR, ESTIMATED: 57 ML/MIN/1.73M2
GLUCOSE SERPL-MCNC: 153 MG/DL (ref 74–99)
GLUCOSE UR STRIP-MCNC: NEGATIVE MG/DL
HCT VFR BLD AUTO: 47.9 % (ref 37–54)
HGB BLD-MCNC: 16.5 G/DL (ref 12.5–16.5)
HGB UR QL STRIP.AUTO: ABNORMAL
IMM GRANULOCYTES # BLD AUTO: 0.06 K/UL (ref 0–0.58)
IMM GRANULOCYTES NFR BLD: 1 % (ref 0–5)
INR PPP: 1.1
KETONES UR STRIP-MCNC: 40 MG/DL
LACTATE BLDV-SCNC: 1.8 MMOL/L (ref 0.5–2.2)
LEUKOCYTE ESTERASE UR QL STRIP: NEGATIVE
LIPASE SERPL-CCNC: 16 U/L (ref 13–60)
LYMPHOCYTES NFR BLD: 0.53 K/UL (ref 1.5–4)
LYMPHOCYTES RELATIVE PERCENT: 5 % (ref 20–42)
MAGNESIUM SERPL-MCNC: 1.9 MG/DL (ref 1.6–2.4)
MCH RBC QN AUTO: 29.6 PG (ref 26–35)
MCHC RBC AUTO-ENTMCNC: 34.4 G/DL (ref 32–34.5)
MCV RBC AUTO: 85.8 FL (ref 80–99.9)
MONOCYTES NFR BLD: 0.5 K/UL (ref 0.1–0.95)
MONOCYTES NFR BLD: 5 % (ref 2–12)
NEUTROPHILS NFR BLD: 89 % (ref 43–80)
NEUTS SEG NFR BLD: 9.27 K/UL (ref 1.8–7.3)
NITRITE UR QL STRIP: NEGATIVE
PH UR STRIP: 6.5 [PH] (ref 5–8)
PLATELET # BLD AUTO: 158 K/UL (ref 130–450)
PMV BLD AUTO: 10.8 FL (ref 7–12)
POTASSIUM SERPL-SCNC: 4.4 MMOL/L (ref 3.5–5.1)
PROT SERPL-MCNC: 7.4 G/DL (ref 6.4–8.3)
PROT UR STRIP-MCNC: ABNORMAL MG/DL
PROTHROMBIN TIME: 11.8 SEC (ref 9.3–12.4)
RBC # BLD AUTO: 5.58 M/UL (ref 3.8–5.8)
RBC #/AREA URNS HPF: ABNORMAL /HPF
SODIUM SERPL-SCNC: 139 MMOL/L (ref 136–145)
SP GR UR STRIP: 1.01 (ref 1–1.03)
UROBILINOGEN UR STRIP-ACNC: 0.2 EU/DL (ref 0–1)
WBC #/AREA URNS HPF: ABNORMAL /HPF
WBC OTHER # BLD: 10.4 K/UL (ref 4.5–11.5)

## 2025-06-23 PROCEDURE — 74177 CT ABD & PELVIS W/CONTRAST: CPT

## 2025-06-23 PROCEDURE — 6370000000 HC RX 637 (ALT 250 FOR IP): Performed by: NURSE PRACTITIONER

## 2025-06-23 PROCEDURE — 80053 COMPREHEN METABOLIC PANEL: CPT

## 2025-06-23 PROCEDURE — 83605 ASSAY OF LACTIC ACID: CPT

## 2025-06-23 PROCEDURE — 96374 THER/PROPH/DIAG INJ IV PUSH: CPT

## 2025-06-23 PROCEDURE — 99285 EMERGENCY DEPT VISIT HI MDM: CPT

## 2025-06-23 PROCEDURE — 85610 PROTHROMBIN TIME: CPT

## 2025-06-23 PROCEDURE — 87086 URINE CULTURE/COLONY COUNT: CPT

## 2025-06-23 PROCEDURE — 83735 ASSAY OF MAGNESIUM: CPT

## 2025-06-23 PROCEDURE — 2580000003 HC RX 258: Performed by: NURSE PRACTITIONER

## 2025-06-23 PROCEDURE — 81001 URINALYSIS AUTO W/SCOPE: CPT

## 2025-06-23 PROCEDURE — 6360000004 HC RX CONTRAST MEDICATION: Performed by: RADIOLOGY

## 2025-06-23 PROCEDURE — 96361 HYDRATE IV INFUSION ADD-ON: CPT

## 2025-06-23 PROCEDURE — 6360000002 HC RX W HCPCS: Performed by: NURSE PRACTITIONER

## 2025-06-23 PROCEDURE — 83690 ASSAY OF LIPASE: CPT

## 2025-06-23 PROCEDURE — 85025 COMPLETE CBC W/AUTO DIFF WBC: CPT

## 2025-06-23 RX ORDER — HYDROCODONE BITARTRATE AND ACETAMINOPHEN 5; 325 MG/1; MG/1
1 TABLET ORAL ONCE
Status: COMPLETED | OUTPATIENT
Start: 2025-06-23 | End: 2025-06-23

## 2025-06-23 RX ORDER — HYDROCODONE BITARTRATE AND ACETAMINOPHEN 5; 325 MG/1; MG/1
1 TABLET ORAL EVERY 4 HOURS PRN
Qty: 18 TABLET | Refills: 0 | Status: SHIPPED | OUTPATIENT
Start: 2025-06-23 | End: 2025-06-26

## 2025-06-23 RX ORDER — 0.9 % SODIUM CHLORIDE 0.9 %
500 INTRAVENOUS SOLUTION INTRAVENOUS ONCE
Status: COMPLETED | OUTPATIENT
Start: 2025-06-23 | End: 2025-06-23

## 2025-06-23 RX ORDER — KETOROLAC TROMETHAMINE 30 MG/ML
15 INJECTION, SOLUTION INTRAMUSCULAR; INTRAVENOUS ONCE
Status: COMPLETED | OUTPATIENT
Start: 2025-06-23 | End: 2025-06-23

## 2025-06-23 RX ORDER — IOPAMIDOL 755 MG/ML
75 INJECTION, SOLUTION INTRAVASCULAR
Status: COMPLETED | OUTPATIENT
Start: 2025-06-23 | End: 2025-06-23

## 2025-06-23 RX ORDER — TAMSULOSIN HYDROCHLORIDE 0.4 MG/1
0.4 CAPSULE ORAL DAILY
Qty: 7 CAPSULE | Refills: 0 | Status: SHIPPED | OUTPATIENT
Start: 2025-06-23 | End: 2025-06-30

## 2025-06-23 RX ADMIN — KETOROLAC TROMETHAMINE 15 MG: 30 INJECTION, SOLUTION INTRAMUSCULAR at 17:53

## 2025-06-23 RX ADMIN — IOPAMIDOL 75 ML: 755 INJECTION, SOLUTION INTRAVENOUS at 16:20

## 2025-06-23 RX ADMIN — SODIUM CHLORIDE 500 ML: 0.9 INJECTION, SOLUTION INTRAVENOUS at 16:19

## 2025-06-23 RX ADMIN — HYDROCODONE BITARTRATE AND ACETAMINOPHEN 1 TABLET: 5; 325 TABLET ORAL at 16:18

## 2025-06-23 RX ADMIN — SODIUM CHLORIDE 500 ML: 0.9 INJECTION, SOLUTION INTRAVENOUS at 19:06

## 2025-06-23 ASSESSMENT — PAIN DESCRIPTION - LOCATION
LOCATION: FLANK

## 2025-06-23 ASSESSMENT — PAIN SCALES - GENERAL
PAINLEVEL_OUTOF10: 5
PAINLEVEL_OUTOF10: 7
PAINLEVEL_OUTOF10: 3

## 2025-06-23 ASSESSMENT — PAIN - FUNCTIONAL ASSESSMENT
PAIN_FUNCTIONAL_ASSESSMENT: ACTIVITIES ARE NOT PREVENTED
PAIN_FUNCTIONAL_ASSESSMENT: 0-10

## 2025-06-23 ASSESSMENT — PAIN DESCRIPTION - DESCRIPTORS
DESCRIPTORS: ACHING;DISCOMFORT
DESCRIPTORS: ACHING;SORE
DESCRIPTORS: STABBING;DISCOMFORT

## 2025-06-23 ASSESSMENT — PAIN DESCRIPTION - ORIENTATION
ORIENTATION: RIGHT

## 2025-06-23 ASSESSMENT — PAIN DESCRIPTION - FREQUENCY: FREQUENCY: CONTINUOUS

## 2025-06-23 ASSESSMENT — PAIN DESCRIPTION - PAIN TYPE: TYPE: ACUTE PAIN

## 2025-06-23 ASSESSMENT — PAIN DESCRIPTION - ONSET: ONSET: ON-GOING

## 2025-06-23 NOTE — ED PROVIDER NOTES
Department of Emergency Medicine  FIRST PROVIDER TRIAGE NOTE             Independent MLP           6/23/25  2:18 PM EDT    Date of Encounter: 6/23/25   MRN: [id number]      HPI: Reji Oliva is a 74 y.o. male who presents to the ED for No chief complaint on file.   Right flank pain started this am with nausea vomiting constipation  No fever or chills  No hx of same  No kidney stones  Had hematuria yesterday  Had heart transplant some yrs ago at [facility]      ROS: Negative for cp or sob.    PE: Gen Appearance/Constitutional: alert  HEENT: NC/NT. PERRLA,  Airway patent.  Neck: supple  CV: regular rate  Pulm: CTA bilat  GI: soft and NT  Musculoskeletal: moves all extremities x 4  Lymphatics: no edema     Initial Plan of Care: All treatment areas with department are currently occupied. Plan to order/Initiate the following while awaiting opening in ED: labs and imaging studies.  Initiate Treatment-Testing, Proceed toTreatment Area When Bed Available for ED Attending/MLP to Continue Care    Electronically signed by SHONNA Ball CNP   DD: 6/23/25       Lynn Israel APRN - CNP  06/23/25 1421

## 2025-06-23 NOTE — ED PROVIDER NOTES
Independent GORAN Visit.       [facility]  Department of Emergency Medicine   ED  Encounter Note  Admit Date/RoomTime: 2025  3:35 PM  ED Room: 10/10    NAME: Reji Oliva  : 1950  MRN: [id number]     Chief Complaint:  Flank Pain (Since this am c/o nausea with vomiting x 1 and Rt flank pain; yesterday noted blood in urine)    History of Present Illness        Reji Oliva is a 74 y.o. old male with a history of high cholesterol, thyroid disease, coronary artery disease, heart transplant in  who presents to the emergency department by private vehicle, for nausea, 1 episode of vomiting and right sided flank pain that started over the last 1 to 2 days.  Yesterday he had some hematuria.  He attempted have a bowel movement today but he was having difficulty passing stool.  He denies chest pain, shortness of breath.  He had no blood in his stool and he had no dark stools.  He has no history of kidney stones. He reports no history of abdominal surgeries.     ROS   Pertinent positives and negatives are stated within HPI, all other systems reviewed and are negative.    Past Medical History:  has a past medical history of Arthritis, CAD (coronary artery disease), CHF (congestive heart failure) (HCC), History of cardiovascular stress test, Hyperlipidemia, and Thyroid disease.    Surgical History:  has a past surgical history that includes fracture surgery; Cardiac catheterization (2021); and Heart transplant (2021).    Social History:  reports that he quit smoking about 45 years ago. His smoking use included cigarettes. He started smoking about 65 years ago. He has a 20 pack-year smoking history. He has quit using smokeless tobacco. He reports that he does not currently use alcohol after a past usage of about 1.0 standard drink of alcohol per week. He reports that he does not use drugs.    Family History: family history includes Coronary Art Dis in his father and mother.

## 2025-06-23 NOTE — DISCHARGE INSTRUCTIONS
NORCO AS NEEDED FOR SEVERE PAIN.  DRINK PLENTY OF FLUIDS.   FOLLOW UP WITH YOUR PRIMARY DOCTOR AND UROLOGY.  RETURN IMMEDIATELY FOR FEVERS, CHILLS, WORSENING PAIN.

## 2025-06-25 LAB
MICROORGANISM SPEC CULT: ABNORMAL
MICROORGANISM SPEC CULT: ABNORMAL
SERVICE CMNT-IMP: ABNORMAL
SPECIMEN DESCRIPTION: ABNORMAL